# Patient Record
Sex: FEMALE | NOT HISPANIC OR LATINO
[De-identification: names, ages, dates, MRNs, and addresses within clinical notes are randomized per-mention and may not be internally consistent; named-entity substitution may affect disease eponyms.]

---

## 2017-07-25 ENCOUNTER — CHARTING TRANS (OUTPATIENT)
Dept: OTHER | Age: 22
End: 2017-07-25

## 2017-09-26 ENCOUNTER — MYAURORA ACCOUNT LINK (OUTPATIENT)
Dept: OTHER | Age: 22
End: 2017-09-26

## 2017-09-26 ENCOUNTER — CHARTING TRANS (OUTPATIENT)
Dept: OBGYN | Age: 22
End: 2017-09-26

## 2017-10-09 ENCOUNTER — CHARTING TRANS (OUTPATIENT)
Dept: OTHER | Age: 22
End: 2017-10-09

## 2017-10-18 ENCOUNTER — LAB SERVICES (OUTPATIENT)
Dept: OTHER | Age: 22
End: 2017-10-18

## 2017-10-18 ENCOUNTER — MYAURORA ACCOUNT LINK (OUTPATIENT)
Dept: OTHER | Age: 22
End: 2017-10-18

## 2017-10-18 ENCOUNTER — IMAGING SERVICES (OUTPATIENT)
Dept: OTHER | Age: 22
End: 2017-10-18

## 2017-10-18 ENCOUNTER — CHARTING TRANS (OUTPATIENT)
Dept: OBGYN | Age: 22
End: 2017-10-18

## 2017-10-18 LAB
BILIRUBIN URINE: NEGATIVE
BLOOD URINE: NEGATIVE
CLARITY: NORMAL
COLOR: YELLOW
GLUCOSE QUALITATIVE U: NEGATIVE
KETONES, URINE: NEGATIVE
LEUKOCYTE ESTERASE URINE: NEGATIVE
NITRITE URINE: NEGATIVE
PH URINE: 7 (ref 5–7)
SPECIFIC GRAVITY URINE: 1.01 (ref 1–1.03)
URINE PROTEIN, QUAL (DIPSTICK): NEGATIVE
UROBILINOGEN URINE: <2

## 2017-10-20 LAB — FINAL REPORT: NORMAL

## 2017-10-25 PROCEDURE — 87086 URINE CULTURE/COLONY COUNT: CPT | Performed by: OBSTETRICS & GYNECOLOGY

## 2017-10-25 PROCEDURE — 86900 BLOOD TYPING SEROLOGIC ABO: CPT | Performed by: OBSTETRICS & GYNECOLOGY

## 2017-10-25 PROCEDURE — 85025 COMPLETE CBC W/AUTO DIFF WBC: CPT | Performed by: OBSTETRICS & GYNECOLOGY

## 2017-10-25 PROCEDURE — 87340 HEPATITIS B SURFACE AG IA: CPT | Performed by: OBSTETRICS & GYNECOLOGY

## 2017-10-25 PROCEDURE — 86762 RUBELLA ANTIBODY: CPT | Performed by: OBSTETRICS & GYNECOLOGY

## 2017-10-25 PROCEDURE — 36415 COLL VENOUS BLD VENIPUNCTURE: CPT | Performed by: OBSTETRICS & GYNECOLOGY

## 2017-10-25 PROCEDURE — 87389 HIV-1 AG W/HIV-1&-2 AB AG IA: CPT | Performed by: OBSTETRICS & GYNECOLOGY

## 2017-10-25 PROCEDURE — 86850 RBC ANTIBODY SCREEN: CPT | Performed by: OBSTETRICS & GYNECOLOGY

## 2017-10-25 PROCEDURE — 86901 BLOOD TYPING SEROLOGIC RH(D): CPT | Performed by: OBSTETRICS & GYNECOLOGY

## 2017-10-25 PROCEDURE — 86780 TREPONEMA PALLIDUM: CPT | Performed by: OBSTETRICS & GYNECOLOGY

## 2017-10-26 ENCOUNTER — CHARTING TRANS (OUTPATIENT)
Dept: OTHER | Age: 22
End: 2017-10-26

## 2017-11-15 LAB — PAP TEST, THIN PREP (ACL): NORMAL

## 2017-11-21 PROBLEM — Z34.00 SUPERVISION OF NORMAL FIRST PREGNANCY: Status: ACTIVE | Noted: 2017-11-21

## 2017-11-21 PROBLEM — Z34.00 SUPERVISION OF NORMAL FIRST PREGNANCY (HCC): Status: ACTIVE | Noted: 2017-11-21

## 2017-12-27 ENCOUNTER — CHARTING TRANS (OUTPATIENT)
Dept: OTHER | Age: 22
End: 2017-12-27

## 2018-02-09 PROCEDURE — 87086 URINE CULTURE/COLONY COUNT: CPT | Performed by: OBSTETRICS & GYNECOLOGY

## 2018-03-21 PROCEDURE — 87389 HIV-1 AG W/HIV-1&-2 AB AG IA: CPT | Performed by: OBSTETRICS & GYNECOLOGY

## 2018-03-21 PROCEDURE — 86780 TREPONEMA PALLIDUM: CPT | Performed by: OBSTETRICS & GYNECOLOGY

## 2018-03-21 PROCEDURE — 82950 GLUCOSE TEST: CPT | Performed by: OBSTETRICS & GYNECOLOGY

## 2018-06-11 PROBLEM — Z34.00 SUPERVISION OF NORMAL FIRST PREGNANCY: Status: RESOLVED | Noted: 2017-11-21 | Resolved: 2018-06-06

## 2018-06-11 PROBLEM — Z34.00 SUPERVISION OF NORMAL FIRST PREGNANCY (HCC): Status: RESOLVED | Noted: 2017-11-21 | Resolved: 2018-06-06

## 2018-08-23 PROBLEM — Z30.41 SURVEILLANCE OF CONTRACEPTIVE PILL: Status: ACTIVE | Noted: 2018-08-23

## 2018-11-02 VITALS
SYSTOLIC BLOOD PRESSURE: 116 MMHG | TEMPERATURE: 97.7 F | DIASTOLIC BLOOD PRESSURE: 68 MMHG | RESPIRATION RATE: 20 BRPM | OXYGEN SATURATION: 99 % | HEIGHT: 65 IN | WEIGHT: 135 LBS | HEART RATE: 83 BPM | BODY MASS INDEX: 22.49 KG/M2

## 2018-11-23 ENCOUNTER — IMAGING SERVICES (OUTPATIENT)
Dept: OTHER | Age: 23
End: 2018-11-23

## 2018-11-28 VITALS
HEIGHT: 65 IN | DIASTOLIC BLOOD PRESSURE: 70 MMHG | BODY MASS INDEX: 22.66 KG/M2 | WEIGHT: 136 LBS | SYSTOLIC BLOOD PRESSURE: 110 MMHG

## 2018-11-28 VITALS
HEIGHT: 65 IN | BODY MASS INDEX: 22.33 KG/M2 | SYSTOLIC BLOOD PRESSURE: 108 MMHG | DIASTOLIC BLOOD PRESSURE: 68 MMHG | WEIGHT: 134 LBS

## 2020-11-15 ENCOUNTER — HOSPITAL ENCOUNTER (OUTPATIENT)
Age: 25
Discharge: HOME | End: 2020-11-15
Payer: COMMERCIAL

## 2020-11-15 VITALS
HEART RATE: 83 BPM | RESPIRATION RATE: 16 BRPM | OXYGEN SATURATION: 98 % | SYSTOLIC BLOOD PRESSURE: 119 MMHG | TEMPERATURE: 99 F | DIASTOLIC BLOOD PRESSURE: 74 MMHG

## 2020-11-15 DIAGNOSIS — J34.89 SINUS PAIN: Primary | ICD-10-CM

## 2020-11-15 PROCEDURE — 99202 OFFICE O/P NEW SF 15 MIN: CPT | Performed by: NURSE PRACTITIONER

## 2020-11-15 RX ORDER — AMOXICILLIN AND CLAVULANATE POTASSIUM 875; 125 MG/1; MG/1
1 TABLET, FILM COATED ORAL EVERY 12 HOURS
COMMUNITY
Start: 2020-11-14

## 2020-11-15 NOTE — ED PROVIDER NOTES
Patient Seen in: [facility]      History   Patient presents with:  Sinus Problem: Sinus pain, ear pain, headache - Entered by patient  Testing    Stated Complaint: in [phone] Sinus Problem - Sinus pain, ear pain, headache    25-year-o HPI.  Constitutional and vital signs reviewed. All other systems reviewed and negative except as noted above.     Physical Exam     ED Triage Vitals [11/15/20 1052]   /74   Pulse 83   Resp 16   Temp 98.7 °F (37.1 °C)   Temp src Temporal   SpO2 98 pain  (primary encounter diagnosis)    Disposition:  Discharge  11/15/2020 11:05 am    Follow-up:  No follow-up provider specified.         Medications Prescribed:  Discharge Medication List as of 11/15/2020 11:07 AM

## 2020-11-15 NOTE — ED INITIAL ASSESSMENT (HPI)
Patient states her sinuses are burning and is having ear pain. Pt had an e-visit yesterday and was prescribed Augmentin for possible ear infection. Patient started abx yesterday. Pt is breastfeeding. Pt exposed to covid by coworker.

## 2023-09-01 ENCOUNTER — HOSPITAL ENCOUNTER (OUTPATIENT)
Age: 28
Discharge: HOME | End: 2023-09-01
Payer: COMMERCIAL

## 2023-09-01 VITALS
WEIGHT: 130 LBS | DIASTOLIC BLOOD PRESSURE: 70 MMHG | HEART RATE: 84 BPM | SYSTOLIC BLOOD PRESSURE: 110 MMHG | HEIGHT: 65 IN | TEMPERATURE: 97 F | BODY MASS INDEX: 21.66 KG/M2 | RESPIRATION RATE: 16 BRPM | OXYGEN SATURATION: 100 %

## 2023-09-01 DIAGNOSIS — J06.9 VIRAL URI WITH COUGH: Primary | ICD-10-CM

## 2023-09-01 PROCEDURE — 99203 OFFICE O/P NEW LOW 30 MIN: CPT | Performed by: PHYSICIAN ASSISTANT

## 2023-09-01 RX ORDER — PREDNISONE 20 MG/1
40 TABLET ORAL DAILY
Qty: 10 TABLET | Refills: 0 | Status: SHIPPED | OUTPATIENT
Start: 2023-09-01 | End: 2023-09-06

## 2023-09-01 RX ORDER — ALBUTEROL SULFATE 90 UG/1
2 AEROSOL, METERED RESPIRATORY (INHALATION) EVERY 4 HOURS PRN
Qty: 1 EACH | Refills: 0 | Status: SHIPPED | OUTPATIENT
Start: 2023-09-01 | End: 2023-10-01

## 2023-09-29 ENCOUNTER — OFFICE VISIT (OUTPATIENT)
Dept: FAMILY MEDICINE CLINIC | Facility: CLINIC | Age: 28
End: 2023-09-29
Payer: COMMERCIAL

## 2023-09-29 VITALS
HEIGHT: 65 IN | TEMPERATURE: 98 F | DIASTOLIC BLOOD PRESSURE: 72 MMHG | OXYGEN SATURATION: 98 % | BODY MASS INDEX: 21.66 KG/M2 | WEIGHT: 130 LBS | RESPIRATION RATE: 18 BRPM | SYSTOLIC BLOOD PRESSURE: 115 MMHG

## 2023-09-29 DIAGNOSIS — J02.0 STREP PHARYNGITIS: Primary | ICD-10-CM

## 2023-09-29 DIAGNOSIS — J02.9 SORE THROAT: ICD-10-CM

## 2023-09-29 LAB
CONTROL LINE PRESENT WITH A CLEAR BACKGROUND (YES/NO): YES YES/NO
KIT LOT #: ABNORMAL NUMERIC
OPERATOR ID: NORMAL
RAPID SARS-COV-2 BY PCR: NOT DETECTED
STREP GRP A CUL-SCR: POSITIVE

## 2023-09-29 PROCEDURE — 99212 OFFICE O/P EST SF 10 MIN: CPT | Performed by: PHYSICIAN ASSISTANT

## 2023-09-29 PROCEDURE — 87880 STREP A ASSAY W/OPTIC: CPT | Performed by: PHYSICIAN ASSISTANT

## 2023-09-29 PROCEDURE — 3078F DIAST BP <80 MM HG: CPT | Performed by: PHYSICIAN ASSISTANT

## 2023-09-29 PROCEDURE — 3074F SYST BP LT 130 MM HG: CPT | Performed by: PHYSICIAN ASSISTANT

## 2023-09-29 PROCEDURE — 3008F BODY MASS INDEX DOCD: CPT | Performed by: PHYSICIAN ASSISTANT

## 2023-09-29 PROCEDURE — U0002 COVID-19 LAB TEST NON-CDC: HCPCS | Performed by: PHYSICIAN ASSISTANT

## 2023-09-29 RX ORDER — AMOXICILLIN 400 MG/5ML
POWDER, FOR SUSPENSION ORAL
Qty: 140 ML | Refills: 0 | Status: SHIPPED | OUTPATIENT
Start: 2023-09-29

## 2024-01-24 ENCOUNTER — HOSPITAL ENCOUNTER (OUTPATIENT)
Age: 29
Discharge: HOME | End: 2024-01-24
Payer: COMMERCIAL

## 2024-01-24 VITALS
OXYGEN SATURATION: 99 % | BODY MASS INDEX: 21.66 KG/M2 | HEART RATE: 75 BPM | TEMPERATURE: 99 F | WEIGHT: 130 LBS | HEIGHT: 65 IN | SYSTOLIC BLOOD PRESSURE: 112 MMHG | DIASTOLIC BLOOD PRESSURE: 70 MMHG | RESPIRATION RATE: 16 BRPM

## 2024-01-24 DIAGNOSIS — J32.9 SINOBRONCHITIS: Primary | ICD-10-CM

## 2024-01-24 DIAGNOSIS — J40 SINOBRONCHITIS: Primary | ICD-10-CM

## 2024-01-24 PROCEDURE — 99203 OFFICE O/P NEW LOW 30 MIN: CPT | Performed by: NURSE PRACTITIONER

## 2024-01-24 RX ORDER — PREDNISONE 20 MG/1
20 TABLET ORAL 2 TIMES DAILY
Qty: 10 TABLET | Refills: 0 | Status: SHIPPED | OUTPATIENT
Start: 2024-01-24 | End: 2024-01-29

## 2024-01-24 RX ORDER — MOMETASONE FUROATE 50 UG/1
1 SPRAY, METERED NASAL DAILY
Qty: 1 EACH | Refills: 0 | Status: SHIPPED | OUTPATIENT
Start: 2024-01-24

## 2024-01-24 RX ORDER — BENZONATATE 100 MG/1
100 CAPSULE ORAL 3 TIMES DAILY PRN
Qty: 30 CAPSULE | Refills: 0 | Status: SHIPPED | OUTPATIENT
Start: 2024-01-24 | End: 2024-02-23

## 2024-01-24 NOTE — DISCHARGE INSTRUCTIONS
Follow-up with your primary care physician for all of your healthcare needs  Continue using your inhaler at home  Steroids twice a day for 5 days  Tessalon Perles for the cough use the Nasonex as directed by the pharmacy  Return to the emergency room department symptoms or concerns

## 2024-01-24 NOTE — ED PROVIDER NOTES
Patient Seen in: [facility]      History     Chief Complaint   Patient presents with    Cough     Persistent cough, productive - Entered by patient     Stated Complaint: Cough - Persistent cough, productive    Subjective:   HPI    28-year-old female presents to the immediate care with cough congestion since Friday.  States the cough is getting a lot more productive.  But denies any shortness of breath denies any difficulty in breathing patient has mild sinus pressure, has been taking, Motrin mild symptoms.  Patient has no other issues with concerns.    The patient's medication list, past medical history and social history elements as listed in today's nurse's notes were reviewed and agreed (except as otherwise stated in the HPI).  The patient's family history reviewed and determined to be noncontributory to the presenting problem.        Objective:   History reviewed. No pertinent past medical history.           Past Surgical History:   Procedure Laterality Date    APPENDECTOMY  2011    LAPAROSCOPIC CHOLECYSTECTOMY  2013                Social History     Socioeconomic History    Marital status:    Tobacco Use    Smoking status: Never     Passive exposure: Never    Smokeless tobacco: Never   Vaping Use    Vaping Use: Never used   Substance and Sexual Activity    Alcohol use: No    Drug use: No    Sexual activity: Yes     Partners: Male              Review of Systems    Positive for stated complaint: Cough - Persistent cough, productive  Other systems are as noted in HPI.  Constitutional and vital signs reviewed.      All other systems reviewed and negative except as noted above.    Physical Exam     ED Triage Vitals [01/24/24 1259]   /70   Pulse 75   Resp 16   Temp 98.6 °F (37 °C)   Temp src Temporal   SpO2 99 %   O2 Device None (Room air)       Current:/70   Pulse 75   Temp 98.6 °F (37 °C) (Temporal)   Resp 16   Ht 165.1 cm (5' 5\")   Wt 59 kg   LMP 12/20/2023 (Exact Date)    SpO2 99%   BMI 21.63 kg/m²         Physical Exam    GENERAL: The patient is well-developed well-nourished nontoxic, non-ill-appearing  HEENT: Normocephalic.  Atraumatic.  Extraocular motions are intact  NECK: Supple.  No meningitic signs are noted.   CHEST/LUNGS: Clear to auscultation.  There is no respiratory distress noted.  HEART/CARDIOVASCULAR: Regular.  There is no tachycardia.   SKIN: There is no rash.  NEURO: The patient is awake, alert, and oriented.  The patient is cooperative.    ED Course   Labs Reviewed - No data to display                 MDM   Patient present with signs and symptoms consistent with upper respiratory infection and consider possible life-threatening etiologies are presenting complaint including severe bacterial infection, meningitis, acute cardiopulmonary process etc.  And doubt given; history, exam, .  Suspect likely viral etiology of upper respiratory infection.  Patient afebrile with normal vital signs and patient nontoxic appearing, well-hydrated in no apparent distress and no respiratory distress, this will discharge to follow-up with primary care physician in 2-3 days.  Supportive care instructions provided.  Patient to take over-the-counter and Tylenol and Motrin as needed for fever and pain.  Advised to return to the emergency room if any new or worsening symptoms including but not limited to; change in mental status.  Meningeal signs, abdominal pain, nausea vomiting, chest pain/shortness of breath, new rash, decreased urinary output or any other concerns.                                     Medical Decision Making      Disposition and Plan     Clinical Impression:  1. Sinobronchitis         Disposition:  Discharge  1/24/2024  1:23 pm    Follow-up:  No follow-up provider specified.        Medications Prescribed:  Discharge Medication List as of 1/24/2024  1:24 PM        START taking these medications    Details   benzonatate 100 MG Oral Cap Take 1 capsule (100 mg total) by mouth  3 (three) times daily as needed for cough., Normal, Disp-30 capsule, R-0      predniSONE 20 MG Oral Tab Take 1 tablet (20 mg total) by mouth 2 (two) times daily for 5 days., Normal, Disp-10 tablet, R-0      mometasone furoate 50 MCG/ACT Nasal Suspension 1 spray by Nasal route daily., Normal, Disp-1 each, R-0

## 2024-09-12 ENCOUNTER — HOSPITAL ENCOUNTER (OUTPATIENT)
Age: 29
Discharge: HOME | End: 2024-09-12
Payer: COMMERCIAL

## 2024-09-12 VITALS
RESPIRATION RATE: 18 BRPM | HEART RATE: 66 BPM | OXYGEN SATURATION: 98 % | SYSTOLIC BLOOD PRESSURE: 108 MMHG | TEMPERATURE: 98 F | DIASTOLIC BLOOD PRESSURE: 88 MMHG

## 2024-09-12 DIAGNOSIS — J02.0 STREPTOCOCCAL SORE THROAT: Primary | ICD-10-CM

## 2024-09-12 LAB
S PYO AG THROAT QL: POSITIVE
SARS-COV-2 RNA RESP QL NAA+PROBE: NOT DETECTED

## 2024-09-12 PROCEDURE — 99213 OFFICE O/P EST LOW 20 MIN: CPT | Performed by: NURSE PRACTITIONER

## 2024-09-12 PROCEDURE — 87880 STREP A ASSAY W/OPTIC: CPT | Performed by: NURSE PRACTITIONER

## 2024-09-12 PROCEDURE — U0002 COVID-19 LAB TEST NON-CDC: HCPCS | Performed by: NURSE PRACTITIONER

## 2024-09-12 RX ORDER — AMOXICILLIN 875 MG
875 TABLET ORAL 2 TIMES DAILY
Qty: 20 TABLET | Refills: 0 | Status: SHIPPED | OUTPATIENT
Start: 2024-09-12 | End: 2024-09-22

## 2024-09-12 NOTE — ED INITIAL ASSESSMENT (HPI)
9/11 Pt started with sore throat  9/12 Sinus pain/pressure, ear pain, sore throat    Denies: fevers

## 2024-09-12 NOTE — ED PROVIDER NOTES
Patient Seen in: [facility]      History     Chief Complaint   Patient presents with    Sore Throat    Sinus Problem     Stated Complaint: sore throat    Subjective:   29-year-old female presenting complaints of sore throat evaluate symptoms and a ear pain.  Denies any difficulty swelling her joints.  No stridor or shortness of breath.  Patient is alert vented x 3.  No other symptoms or concerns.              Objective:   History reviewed. No pertinent past medical history.           Past Surgical History:   Procedure Laterality Date    Appendectomy  2011    Laparoscopic cholecystectomy  2013                Social History     Socioeconomic History    Marital status:    Tobacco Use    Smoking status: Never     Passive exposure: Never    Smokeless tobacco: Never   Vaping Use    Vaping status: Never Used   Substance and Sexual Activity    Alcohol use: No    Drug use: No    Sexual activity: Yes     Partners: Male     Social Determinants of Health     Food Insecurity: Low Risk  (4/9/2022)    Received from [facility]    Food Insecurity     Have there been times that your food ran out, and you didn't have money to get more?: No     Are there times that you worry that this might happen?: No   Transportation Needs: Low Risk  (4/9/2022)    Received from [facility]    Transportation Needs     Do you have trouble getting transportation to medical appointments?: No   Housing Stability: Low Risk  (4/9/2022)    Received from [facility]    Housing Stability     Are you concerned about having a safe and reliable place to live?: No              Review of Systems    Positive for stated Chief Complaint: Sore Throat and Sinus Problem    Other systems are as noted in HPI.  Constitutional and vital signs reviewed.      All other systems reviewed and  negative except as noted above.    Physical Exam     ED Triage Vitals [09/12/24 1348]   /88   Pulse 66   Resp 18   Temp 97.9 °F (36.6 °C)   Temp src Temporal   SpO2 98 %   O2 Device None (Room air)       Current Vitals:   Vital Signs  BP: 108/88  Pulse: 66  Resp: 18  Temp: 97.9 °F (36.6 °C)  Temp src: Temporal    Oxygen Therapy  SpO2: 98 %  O2 Device: None (Room air)            Physical Exam  Vitals and nursing note reviewed.   Constitutional:       Appearance: Normal appearance.   HENT:      Head: Normocephalic.      Right Ear: Tympanic membrane normal.      Left Ear: Tympanic membrane normal.      Nose: Nose normal.      Mouth/Throat:      Mouth: Mucous membranes are moist.      Pharynx: Posterior oropharyngeal erythema present.   Eyes:      Pupils: Pupils are equal, round, and reactive to light.   Cardiovascular:      Rate and Rhythm: Normal rate.   Pulmonary:      Effort: Pulmonary effort is normal.      Breath sounds: Normal breath sounds.   Musculoskeletal:      Cervical back: Normal range of motion and neck supple.   Lymphadenopathy:      Cervical: Cervical adenopathy present.   Skin:     General: Skin is warm and dry.   Neurological:      Mental Status: She is alert and oriented to person, place, and time.               ED Course     Labs Reviewed   POCT RAPID STREP - Abnormal; Notable for the following components:       Result Value    POCT Rapid Strep Positive (*)     All other components within normal limits   RAPID SARS-COV-2 BY PCR - Normal                      MDM                                      Medical Decision Making  Differential diagnosis includes but is not limited to: Strep throat, COVID-19, viral illness, pneumonia      Patient is a complaints of sore throat with ear pain and URI symptoms.  Testing was done and negative.  Rapid strep was positive.  Will treat with amoxicillin take as directed.  To continue taking Tylenol Motrin.  Push fluids and rest.  Encouraged follow-up with  primary care physician in 1 week if symptoms do not improve.  Patient verbalized understanding and agreed to plan of care.    Amount and/or Complexity of Data Reviewed  Labs: ordered. Decision-making details documented in ED Course.     Details: Rapid strep  COVID-19    Risk  OTC drugs.  Prescription drug management.        Disposition and Plan     Clinical Impression:  1. Streptococcal sore throat         Disposition:  Discharge  9/12/2024  2:14 pm    Follow-up:  PCP    In 1 week  As needed          Medications Prescribed:  Current Discharge Medication List        START taking these medications    Details   amoxicillin 875 MG Oral Tab Take 1 tablet (875 mg total) by mouth 2 (two) times daily for 10 days.  Qty: 20 tablet, Refills: 0

## 2024-12-08 ENCOUNTER — OFFICE VISIT (OUTPATIENT)
Dept: FAMILY MEDICINE CLINIC | Facility: CLINIC | Age: 29
End: 2024-12-08
Payer: COMMERCIAL

## 2024-12-08 VITALS
HEART RATE: 75 BPM | WEIGHT: 131 LBS | BODY MASS INDEX: 21.83 KG/M2 | DIASTOLIC BLOOD PRESSURE: 68 MMHG | SYSTOLIC BLOOD PRESSURE: 102 MMHG | OXYGEN SATURATION: 99 % | HEIGHT: 65 IN | TEMPERATURE: 98 F | RESPIRATION RATE: 16 BRPM

## 2024-12-08 DIAGNOSIS — J02.0 STREP PHARYNGITIS: Primary | ICD-10-CM

## 2024-12-08 LAB
CONTROL LINE PRESENT WITH A CLEAR BACKGROUND (YES/NO): YES YES/NO
KIT LOT #: ABNORMAL NUMERIC
STREP GRP A CUL-SCR: POSITIVE

## 2024-12-08 RX ORDER — AMOXICILLIN 500 MG/1
500 CAPSULE ORAL 2 TIMES DAILY
Qty: 20 CAPSULE | Refills: 0 | Status: SHIPPED | OUTPATIENT
Start: 2024-12-08 | End: 2024-12-18

## 2024-12-08 NOTE — PATIENT INSTRUCTIONS
1. Rest. Drink plenty of fluids.  2. Tylenol/Ibuprofen for pain/fevers. Amoxicillin as prescribed.  3. Salt water gargles three times daily  4. Use humidifier at home when possible.  5. The rapid strep test is positive.  6. Viral testing declined.    7. Follow up with PMD in 4-5 days for re-eval. Go to the emergency department immediately if symptoms worsen, change, you develop chest discomfort, wheezing, shortness of breath, or if you have any concerns.

## 2024-12-08 NOTE — PROGRESS NOTES
CHIEF COMPLAINT:     Chief Complaint   Patient presents with    Sore Throat     Yesterday, sore throat,   OTC advil  Exposure to strep       HPI:   Mariela Fontanez is a 29 year old female who presents for sore throat. Patient reports symtpoms started yesterday.  Denies any fevers, cough,wheezing, chest discomfort, or shortness of breath. .  Treating symptoms with otc meds.   Tolerates PO well at home. No n/v/d.  Denies any other aggravating or relieving factors at home. Denies any other treatment attempts prior to arrival.   + strep exposure. Denies known covid-19 expsoure.    Current Outpatient Medications   Medication Sig Dispense Refill    amoxicillin 500 MG Oral Cap Take 1 capsule (500 mg total) by mouth 2 (two) times daily for 10 days. 20 capsule 0      No past medical history on file.   Past Surgical History:   Procedure Laterality Date    Appendectomy  2011    Laparoscopic cholecystectomy  2013         Social History     Socioeconomic History    Marital status:    Tobacco Use    Smoking status: Never     Passive exposure: Never    Smokeless tobacco: Never   Vaping Use    Vaping status: Never Used   Substance and Sexual Activity    Alcohol use: No    Drug use: No    Sexual activity: Yes     Partners: Male     Social Drivers of Health     Food Insecurity: Low Risk  (4/9/2022)    Received from [facility]    Food Insecurity     Have there been times that your food ran out, and you didn't have money to get more?: No     Are there times that you worry that this might happen?: No   Transportation Needs: Low Risk  (4/9/2022)    Received from [facility]    Transportation Needs     Do you have trouble getting transportation to medical appointments?: No   Housing Stability: Low Risk  (4/9/2022)    Received from [facility]    Housing Stability      Are you concerned about having a safe and reliable place to live?: No         REVIEW OF SYSTEMS:   GENERAL: Denies fever. Notes good appetite  SKIN: no rashes or abnormal skin lesions  HEENT: + sore throat, Denies sinus symptoms, or ear pain  LUNGS: Denies cough, denies shortness of breath or wheezing,   CARDIOVASCULAR: denies chest pain or palpitations   GI: denies N/V/C or abdominal pain  NEURO: Denies headaches    EXAM:   /68   Pulse 75   Temp 98 °F (36.7 °C)   Resp 16   Ht 5' 5\" (1.651 m)   Wt 131 lb (59.4 kg)   LMP 11/17/2024 (Approximate)   SpO2 99%   Breastfeeding No   BMI 21.80 kg/m²   GENERAL: well developed, well nourished,in no apparent distress  SKIN: no rashes,no suspicious lesions  HEAD: atraumatic, normocephalic.    EYES: conjunctiva clear  EARS: TM's intact and without erythema, no bulging, no retraction,no fluid, bony landmarks visualized. No erythema or swelling noted to ear canals or external ears.   NOSE: Nostrils patent, no nasal discharge, nasal mucosa reddened   THROAT: Oral mucosa pink, moist. Posterior pharynx is erythematous. No exudates. No tonsillar hypertrophy noted.  No trismus. Uvula midline with no swelling. Voice clear/normal. No stridor  NECK: Supple, non-tender  LUNGS: clear to auscultation bilaterally, no rales, wheezes or rhonchi. Breathing is non labored.  CARDIO: RRR without murmur  EXTREMITIES: no cyanosis, clubbing or edema  LYMPH:  + ant cerv lymphadenopathy.        ASSESSMENT AND PLAN:       ICD-10-CM    1. Strep pharyngitis  J02.0 Strep A Assay W/Optic     amoxicillin 500 MG Oral Cap        Rapid strep positive  Viral panel testing declined.     Discussed physical exam and hpi with pt.  Pt has reassuring physical exam consistent with pharyngitis. Rapid strep positive. No signs of pta or retropharyngeal infection.Lungs clear bilat. No respiratory distress noted. We discussed possible concurrent flu or covid infection. Pt declined. Treatment options  discussed with patient and explained in detail. We reviewed symptomatic care at home and will start amoxicillin for strep pharyngitis.. The risks, benefits and potential side effects of possible medications were reviewed. Alternatives were discussed. Monitoring parameters and expected course outlined. We reviewed self quarantine guidelines. Patient to call PCP or go to emergency department if symptoms fail to respond as outlined, or worsen in any way. The patient agreed with the plan.       Patient Instructions   1. Rest. Drink plenty of fluids.  2. Tylenol/Ibuprofen for pain/fevers. Amoxicillin as prescribed.  3. Salt water gargles three times daily  4. Use humidifier at home when possible.  5. The rapid strep test is positive.  6. Viral testing declined.    7. Follow up with PMD in 4-5 days for re-eval. Go to the emergency department immediately if symptoms worsen, change, you develop chest discomfort, wheezing, shortness of breath, or if you have any concerns.

## 2025-01-07 NOTE — PROGRESS NOTES
ID: Mariela Fontanez  : 1995  Date: 2025     Chief Complaint   Patient presents with    Pelvic Pain     Self referred       HPI:  29 year old female, G3, Vaginal deliveries x3, who is self referred. She presents with symptoms of \"extreme pain with menstruation\". Periods have been heavier and extremely painful, associated with nausea and malaise.  Follows up with Dr. Garrido with Gyn.  Reports she has been tried on different birth control with no relief, so she stopped.  Has also had normal ultrasounds and a normal EMB as well.   Has chronic constipation with bowel movements every 3 days.  Takes stool softeners as needed. She has been experiencing worsening pain with defecation and describes vaginal drainage and bleeding when she is having a BM.   Denies urinary frequency, urgency, or nocturia. No UUI.  Some BESSIE  Denies urinary tract infections. No urines on file.   Also bothered by dyspareunia, which is new.   Healthy otherwise. Has had prior appendectomy and cholecystectomy.        Urogynecology Summary:  Urogynecology Summary  Prolapse: No  BESSIE: Yes  Urge Incontinence: No  Nocturia Frequency: 0  Frequency: Greater than 3 hours  Incomplete emptying: No  Constipation: Yes  Wears pad day?: 1 (light)  Wears Pad Night?: 0  Activities are limited by UI/POP?: No (pain)  Currently Sexually Active: Yes  Avoids sexual activity due to: Pain          HISTORY:  Past Medical History:     (normal spontaneous vaginal delivery) ([facility])    x3      Past Surgical History:   Procedure Laterality Date    Appendectomy  2011    Laparoscopic cholecystectomy        Family History   Problem Relation Age of Onset    Hypertension Mother     Kidney Disease Mother     Diabetes Father     Hypertension Father     Dementia Maternal Grandmother     Diabetes Paternal Grandmother       Social History     Socioeconomic History    Marital status:    Tobacco Use    Smoking status: Never     Passive exposure: Never    Smokeless  tobacco: Never   Vaping Use    Vaping status: Never Used   Substance and Sexual Activity    Alcohol use: No    Drug use: No    Sexual activity: Yes     Partners: Male     Social Drivers of Health     Food Insecurity: Low Risk  (4/9/2022)    Received from [facility]    Food Insecurity     Have there been times that your food ran out, and you didn't have money to get more?: No     Are there times that you worry that this might happen?: No   Transportation Needs: Low Risk  (4/9/2022)    Received from [facility]    Transportation Needs     Do you have trouble getting transportation to medical appointments?: No   Housing Stability: Low Risk  (4/9/2022)    Received from [facility]    Housing Stability     Are you concerned about having a safe and reliable place to live?: No        Allergies:  Allergies[1]    Medications:  Medications Prior to Visit[2]    Review of Systems:    A comprehensive 12 point review of systems was completed.  Pertinent positives noted in the the HPI.  Denies CP  Denies SOB    Vitals:  /70   Temp 98.1 °F (36.7 °C)   Ht 67\"   Wt 130 lb (59 kg)   LMP 12/12/2024 (Approximate)   BMI 20.36 kg/m²        GENERAL EXAM:  GENERAL:  Alert and oriented. Well-nourished, normally developed.  Thought and emotional status are appropriate, speech is understandable.  No acute distress.   HEAD: Normocephalic and atraumatic with normal hair distribution  LUNGS:  Normal respiratory effort.    ABDOMEN: Non tender to palpation, tone normal without rigidity or guarding, no masses present, no evidence of hernia.   EXTREMITIES:  Without edema, varicosities or lesions.   SKIN:  Warm and dry, with good color and turgor. No lesions.    PELVIC EXAM:  External Genitalia: Normal appearance for age. No atrophy, no lesions  Urethra: no atrophy, non  tender  Bladder:no fullness, non tender  Vagina: no atrophy, no lesions, + white thick discharge. Vaginitis panel obtained   Cervix: no bleeding, no lesions, non tender  Uterus: soft, mobile, non tender  Adnexa:no masses, non tender  Perineum: non tender  Anus: no hemorrhoids  Rectum: + tender with palpation, no masses.     PELVIS FLOOR NEUROMUSCULAR FUNCTION:  Strength:  3  Perineal Sensation:  Normal      PELVIC SUPPORT:  [place]:  0  Ant:  0  Post:  0  CST:  negative  UVJ: not hypermobile    The patient provided a clean catch urine specimen. Dip + trace blood. Specimen sent for micro, C&S.     Impression/Plan:    ICD-10-CM    1. Pelvic pain  R10.2 MRI PELVIS (SOFT TISSUE) (W+WO) (CPT=72197)     PHYSICAL THERAPY - [facility]      2. Menorrhagia with irregular cycle  N92.1       3. Dysmenorrhea  N94.6       4. Constipation, unspecified constipation type  K59.00       5. Rectal pain  K62.89       6. Vaginal discharge  N89.8 Vaginitis Vaginosis PCR Panel      7. Dyspareunia in female  N94.10           Discussion:   Pelvic muscle rehabilitation including pelvic floor PT  Bowel routine/constipation regimen  Discussed multifactorial nature of symptoms (endometriosis, adenomyosis, constipation, muscle pain) and need for multidisciplinary approach to treatment.     Diagnostic Items:  UA, micro, C&S  Pelvic MRI     Medications Discussed:  Daily fiber and Miralax    Treatment Plan, Non-surgical:   Pelvic floor PT. Referral provided.     Treatment Plan, Surgical:   None at this time.     Recommend referral to see Dr. Mauricio ([facility]) and Dr. Lobo ([facility]). Pt and  verbalize understanding of all above discussed information    Follow up in 4 months or sooner prn.     Cydney Jackson MD, FACOG, FACS  Female Pelvic Medicine and  Reconstructive Surgery (Urogynecology)             [1]   Allergies  Allergen Reactions    Sulfa Antibiotics HIVES   [2]   No outpatient medications prior to visit.     No  facility-administered medications prior to visit.

## 2025-01-08 ENCOUNTER — OFFICE VISIT (OUTPATIENT)
Dept: UROLOGY | Facility: CLINIC | Age: 30
End: 2025-01-08
Attending: OBSTETRICS & GYNECOLOGY
Payer: COMMERCIAL

## 2025-01-08 VITALS
HEIGHT: 67 IN | DIASTOLIC BLOOD PRESSURE: 70 MMHG | SYSTOLIC BLOOD PRESSURE: 110 MMHG | WEIGHT: 130 LBS | TEMPERATURE: 98 F | BODY MASS INDEX: 20.4 KG/M2

## 2025-01-08 DIAGNOSIS — K62.89 RECTAL PAIN: ICD-10-CM

## 2025-01-08 DIAGNOSIS — N94.6 DYSMENORRHEA: ICD-10-CM

## 2025-01-08 DIAGNOSIS — K59.00 CONSTIPATION, UNSPECIFIED CONSTIPATION TYPE: ICD-10-CM

## 2025-01-08 DIAGNOSIS — N89.8 VAGINAL DISCHARGE: ICD-10-CM

## 2025-01-08 DIAGNOSIS — N94.10 DYSPAREUNIA IN FEMALE: ICD-10-CM

## 2025-01-08 DIAGNOSIS — N92.1 MENORRHAGIA WITH IRREGULAR CYCLE: ICD-10-CM

## 2025-01-08 DIAGNOSIS — R10.2 PELVIC PAIN: Primary | ICD-10-CM

## 2025-01-08 LAB
BILIRUB UR QL STRIP.AUTO: NEGATIVE
CLARITY UR REFRACT.AUTO: CLEAR
CONTROL RUN WITHIN 24 HOURS?: YES
GLUCOSE UR STRIP.AUTO-MCNC: NORMAL MG/DL
KETONES UR STRIP.AUTO-MCNC: NEGATIVE MG/DL
LEUKOCYTE ESTERASE UR QL STRIP.AUTO: NEGATIVE
LEUKOCYTE ESTERASE URINE: NEGATIVE
NITRITE UR QL STRIP.AUTO: NEGATIVE
NITRITE URINE: NEGATIVE
PH UR STRIP.AUTO: 6.5 [PH] (ref 5–8)
PROT UR STRIP.AUTO-MCNC: NEGATIVE MG/DL
RBC UR QL AUTO: NEGATIVE
SP GR UR STRIP.AUTO: 1.02 (ref 1–1.03)
UROBILINOGEN UR STRIP.AUTO-MCNC: NORMAL MG/DL

## 2025-01-08 PROCEDURE — 81002 URINALYSIS NONAUTO W/O SCOPE: CPT | Performed by: OBSTETRICS & GYNECOLOGY

## 2025-01-08 PROCEDURE — 87086 URINE CULTURE/COLONY COUNT: CPT | Performed by: OBSTETRICS & GYNECOLOGY

## 2025-01-08 PROCEDURE — 99212 OFFICE O/P EST SF 10 MIN: CPT

## 2025-01-08 PROCEDURE — 81514 NFCT DS BV&VAGINITIS DNA ALG: CPT | Performed by: OBSTETRICS & GYNECOLOGY

## 2025-01-08 PROCEDURE — 81003 URINALYSIS AUTO W/O SCOPE: CPT | Performed by: OBSTETRICS & GYNECOLOGY

## 2025-01-10 ENCOUNTER — TELEPHONE (OUTPATIENT)
Dept: UROLOGY | Facility: CLINIC | Age: 30
End: 2025-01-10

## 2025-01-10 LAB
BV BACTERIA DNA VAG QL NAA+PROBE: POSITIVE
C GLABRATA DNA VAG QL NAA+PROBE: NEGATIVE
C KRUSEI DNA VAG QL NAA+PROBE: NEGATIVE
CANDIDA DNA VAG QL NAA+PROBE: NEGATIVE
T VAGINALIS DNA VAG QL NAA+PROBE: NEGATIVE

## 2025-01-10 RX ORDER — METRONIDAZOLE 7.5 MG/G
5 GEL VAGINAL NIGHTLY
Qty: 70 G | Refills: 0 | Status: SHIPPED | OUTPATIENT
Start: 2025-01-10

## 2025-01-10 NOTE — TELEPHONE ENCOUNTER
TC to pt w/ ucx and vag swab results.   Per Dr Jackson: Metrogel 1 applicatorfull per vagina at bedtime x 5 days   Pt verbalized an understanding and agrees w/ plan. All questions answered.

## 2025-01-30 ENCOUNTER — HOSPITAL ENCOUNTER (OUTPATIENT)
Dept: MRI IMAGING | Age: 30
Discharge: HOME | End: 2025-01-30
Attending: OBSTETRICS & GYNECOLOGY
Payer: COMMERCIAL

## 2025-01-30 DIAGNOSIS — R10.2 PELVIC PAIN: ICD-10-CM

## 2025-01-30 PROCEDURE — 72197 MRI PELVIS W/O & W/DYE: CPT | Performed by: OBSTETRICS & GYNECOLOGY

## 2025-01-30 PROCEDURE — A9575 INJ GADOTERATE MEGLUMI 0.1ML: HCPCS | Performed by: OBSTETRICS & GYNECOLOGY

## 2025-01-30 RX ORDER — GADOTERATE MEGLUMINE 376.9 MG/ML
12 INJECTION INTRAVENOUS
Status: COMPLETED | OUTPATIENT
Start: 2025-01-30 | End: 2025-01-30

## 2025-01-30 RX ADMIN — GADOTERATE MEGLUMINE 12 ML: 376.9 INJECTION INTRAVENOUS at 13:53:00

## 2025-03-13 ENCOUNTER — OFFICE VISIT (OUTPATIENT)
Facility: LOCATION | Age: 30
End: 2025-03-13
Payer: COMMERCIAL

## 2025-03-13 VITALS
DIASTOLIC BLOOD PRESSURE: 73 MMHG | HEART RATE: 68 BPM | RESPIRATION RATE: 18 BRPM | SYSTOLIC BLOOD PRESSURE: 104 MMHG | OXYGEN SATURATION: 100 % | TEMPERATURE: 98 F

## 2025-03-13 DIAGNOSIS — K62.89 RECTAL PAIN: Primary | ICD-10-CM

## 2025-03-13 DIAGNOSIS — K62.5 RECTAL BLEEDING: ICD-10-CM

## 2025-03-13 PROCEDURE — 99203 OFFICE O/P NEW LOW 30 MIN: CPT | Performed by: STUDENT IN AN ORGANIZED HEALTH CARE EDUCATION/TRAINING PROGRAM

## 2025-03-13 PROCEDURE — 3074F SYST BP LT 130 MM HG: CPT | Performed by: STUDENT IN AN ORGANIZED HEALTH CARE EDUCATION/TRAINING PROGRAM

## 2025-03-13 PROCEDURE — 3078F DIAST BP <80 MM HG: CPT | Performed by: STUDENT IN AN ORGANIZED HEALTH CARE EDUCATION/TRAINING PROGRAM

## 2025-03-13 RX ORDER — POLYETHYLENE GLYCOL 3350, SODIUM CHLORIDE, SODIUM BICARBONATE, POTASSIUM CHLORIDE 420; 11.2; 5.72; 1.48 G/4L; G/4L; G/4L; G/4L
POWDER, FOR SOLUTION ORAL
Qty: 1 EACH | Refills: 0 | Status: SHIPPED | OUTPATIENT
Start: 2025-03-13

## 2025-03-17 ENCOUNTER — TELEPHONE (OUTPATIENT)
Facility: LOCATION | Age: 30
End: 2025-03-17

## 2025-03-17 NOTE — TELEPHONE ENCOUNTER
JOSE ALEJANDRO GALLOWAY Patient  Member ID  [id number]    Date of Birth  1995-07-09    Gender  Female    Relationship to Subscriber  Spouse    Subscriber Name  RAQUEL GALLOWAY    Transaction Type  Outpatient Authorization    Organization  [facility]    Payer  [facility] logo     Certificate Information  Reference Number  [id number]    Status  NO ACTION REQUIRED    Message  Requested Service does not require preauthorization. We would strongly encourage you to check benefits for this service.    Member Information  Patient Name  JOSE ALEJANDRO GALLOWAY    Patient Date of Birth  1995-07-09    Patient Gender  Female    Member ID  [id number]    Relationship to Subscriber  Spouse    Subscriber Name  RAQUEL GALLOWAY    Requesting Provider     Name  MJ RHODA    NPI  [id number]    Tax Id  [id number]    Specialty  [id number]    Provider Role  Provider    Address  [address] [place] [address]    Phone  [phone]    Fax  [phone]    Contact Name  AXEL COTTO    Service Information  Service Type  2 - Surgical    Place of Service  22 - On [facility]    Service From - To Date  2025-04-01 - 2025-06-27    Level of Service  Elective    Diagnosis Code 1   - Other specified diseases of anus and rectum    Diagnosis Code 2  K625 - Hemorrhage of anus and rectum    Procedure Code 1 (CPT/HCPCS)  21618 - DIAGNOSTIC COLONOSCOPY    Quantity  1 Units    Status  NO ACTION REQUIRED

## 2025-03-18 NOTE — H&P
New Patient Visit Note       Active Problems      1. Rectal pain    2. Rectal bleeding        Chief Complaint   Chief Complaint   Patient presents with    New Patient     NP - Referral from UroGyn Dr. Jackson for rectal pain with menstruation. MRI PELVIS (SOFT TISSUE) . Pt reports rectal pain during menstruation for approximately the last year. Pt reports rectal bleeding. Pt denies constipation or diarrhea.        History of Present Illness   This is a very nice 29-year-old female who was referred to me from Dr. Mauricio of gynecology with concern for endometriosis with possible colorectal involvement.  Patient has noticed rectal pain and bleeding with bowel movements for the last year.  The symptoms only happen when the patient is menstruating.  Patient generally becomes constipated the week before her menstrual cycle.  When patient is not menstruating, she denies any rectal pain, bleeding or constipation.    Patient also has generalized severe pelvic pain during her menstrual cycles.  She is thought to have endometriosis and possible adenomyosis.  The symptoms have been refractory to medical therapy thus far.  Patient has noticed that when she has a bowel movement during her menstrual cycle, she will have occasions with severe pain causing vaginal discharge and bleeding after the bowel movement.  Patient has a history of 3 vaginal deliveries and denies any history of obstetric cares.  She denies any fecal incontinence.  Patient has had a colonoscopy around 10 years ago which was normal.  No family history of colon or rectal cancer.  No blood thinners.  No prior abdominal or anorectal surgery.    Allergies  Mariela is allergic to sulfa antibiotics.    Past Medical / Surgical / Social / Family History    The past medical and past surgical history have been reviewed by me today.    Past Medical History:    Allergic rhinitis     (normal spontaneous vaginal delivery) (HCC)    x3     Past Surgical History:    Procedure Laterality Date    Appendectomy  2011    Appendectomy  2011    Cholecystectomy      Colonoscopy      Laparoscopic cholecystectomy  2013          X3    Reconstr nose+markus septal repair         The family history and social history have been reviewed by me today.    Family History   Problem Relation Age of Onset    Hypertension Mother     Kidney Disease Mother     Diabetes Father     Hypertension Father     Dementia Maternal Grandmother     Diabetes Paternal Grandmother      Social History     Socioeconomic History    Marital status:    Tobacco Use    Smoking status: Never     Passive exposure: Never    Smokeless tobacco: Never   Vaping Use    Vaping status: Never Used   Substance and Sexual Activity    Alcohol use: Yes     Comment: Rare occasion, < 1/month    Drug use: Never    Sexual activity: Yes     Partners: Male   Other Topics Concern    Caffeine Concern No    Exercise Yes    Seat Belt Yes    Special Diet No    Stress Concern No    Weight Concern No        Current Outpatient Medications:     PEG 3350-KCl-Na Bicarb-NaCl (TRILYTE) 420 g Oral Recon Soln, Starting at 4:00 pm the night before procedure, drink 8 ounces of the prep every 15-20 minutes until finished, Disp: 1 each, Rfl: 0    metroNIDAZOLE 0.75 % Vaginal Gel, Place 5 g vaginally nightly., Disp: 70 g, Rfl: 0      Review of Systems  A 10 point review of systems was performed and negative unless otherwise documented per HPI.    Physical Findings   /73 (BP Location: Right arm, Patient Position: Sitting, Cuff Size: adult)   Pulse 68   Temp 98.3 °F (36.8 °C) (Temporal)   Resp 18   LMP 2024 (Approximate)   SpO2 100%   Physical Exam  Vitals and nursing note reviewed. Exam conducted with a chaperone present.   Constitutional:       General: She is not in acute distress.  HENT:      Head: Normocephalic and atraumatic.      Mouth/Throat:      Mouth: Mucous membranes are moist.   Cardiovascular:      Rate and Rhythm:  Normal rate and regular rhythm.   Pulmonary:      Effort: Pulmonary effort is normal.   Abdominal:      General: There is no distension.      Palpations: Abdomen is soft.      Tenderness: There is no abdominal tenderness.   Musculoskeletal:         General: No deformity.   Skin:     General: Skin is warm and dry.   Neurological:      General: No focal deficit present.      Mental Status: She is alert.   Psychiatric:         Mood and Affect: Mood normal.     I have personally reviewed the imaging of patient's recent MRI pelvis from 1/30/2025.  I agree with radiology conclusion as below    CONCLUSION:    1. Small amount of free fluid in the pelvis is likely physiologic.   2. Tortuosity the bilateral gonadal veins, worse on the left is nonspecific although may be seen with pelvic congestion.  Correlate clinically.        Assessment   1. Rectal pain    2. Rectal bleeding        This is a very nice 29-year-old female who was referred to me from Dr. Mauricio of gynecology with concern for endometriosis with possible colorectal involvement.  Patient has noticed rectal pain and bleeding with bowel movements for the last year.  The symptoms only happen when the patient is menstruating.  Patient generally becomes constipated the week before her menstrual cycle.  When patient is not menstruating, she denies any rectal pain, bleeding or constipation.    Patient also has generalized severe pelvic pain during her menstrual cycles.  She is thought to have endometriosis and possible adenomyosis.  The symptoms have been refractory to medical therapy thus far.  Patient has noticed that when she has a bowel movement during her menstrual cycle, she will have occasions with severe pain causing vaginal discharge and bleeding after the bowel movement.  Patient has a history of 3 vaginal deliveries and denies any history of obstetric cares.  She denies any fecal incontinence.  Patient has had a colonoscopy around 10 years ago which was  normal.  No family history of colon or rectal cancer.  No blood thinners.  No prior abdominal or anorectal surgery.    Patient is well-appearing on exam today.  Patient has had a recent MRI of the pelvis on 1/30/2025 which was normal aside from a small amount of free fluid in the pelvis thought to be physiologic and tortuosity of the bilateral gonadal veins, worse on the left.  This finding is nonspecific though may be seen with pelvic congestion per radiology.    Plan  I counseled patient that her symptoms are suspicious for possible colon or rectal involvement of endometriosis.  I would like to review the patient's recent MRI in detail to see if there is suggestion of bowel, colon or rectal involvement of endometriosis on this imaging.  I also recommend planning for a colonoscopy with digital rectal exam and bimanual pelvic examination under anesthesia to rule out any full-thickness colorectal endometriosis.  The details of the colonoscopy procedure were discussed including the prep instructions, risks, benefits and alternatives.  Patient expressed understanding and was agreeable to schedule a colonoscopy.  This has been scheduled for 4/1/2025 at [facility] under monitored anesthesia care.    After completion of the colonoscopy, careful review the MRI and discussion of the patient's case with Dr. Mauricio, I will coordinate care with Dr. Mauricio and determine my involvement for any upcoming surgical intervention in regards to the patient's endometriosis.     No orders of the defined types were placed in this encounter.      Imaging & Referrals   None    Follow Up  No follow-ups on file.    Jayro Lobo MD

## 2025-03-18 NOTE — H&P (VIEW-ONLY)
New Patient Visit Note       Active Problems      1. Rectal pain    2. Rectal bleeding        Chief Complaint   Chief Complaint   Patient presents with    New Patient     NP - Referral from UroGyn Dr. Jackson for rectal pain with menstruation. MRI PELVIS (SOFT TISSUE) . Pt reports rectal pain during menstruation for approximately the last year. Pt reports rectal bleeding. Pt denies constipation or diarrhea.        History of Present Illness   This is a very nice 29-year-old female who was referred to me from Dr. Mauricio of gynecology with concern for endometriosis with possible colorectal involvement.  Patient has noticed rectal pain and bleeding with bowel movements for the last year.  The symptoms only happen when the patient is menstruating.  Patient generally becomes constipated the week before her menstrual cycle.  When patient is not menstruating, she denies any rectal pain, bleeding or constipation.    Patient also has generalized severe pelvic pain during her menstrual cycles.  She is thought to have endometriosis and possible adenomyosis.  The symptoms have been refractory to medical therapy thus far.  Patient has noticed that when she has a bowel movement during her menstrual cycle, she will have occasions with severe pain causing vaginal discharge and bleeding after the bowel movement.  Patient has a history of 3 vaginal deliveries and denies any history of obstetric cares.  She denies any fecal incontinence.  Patient has had a colonoscopy around 10 years ago which was normal.  No family history of colon or rectal cancer.  No blood thinners.  No prior abdominal or anorectal surgery.    Allergies  Mariela is allergic to sulfa antibiotics.    Past Medical / Surgical / Social / Family History    The past medical and past surgical history have been reviewed by me today.    Past Medical History:    Allergic rhinitis     (normal spontaneous vaginal delivery) (HCC)    x3     Past Surgical History:    Procedure Laterality Date    Appendectomy  2011    Appendectomy  2011    Cholecystectomy      Colonoscopy      Laparoscopic cholecystectomy  2013          X3    Reconstr nose+markus septal repair         The family history and social history have been reviewed by me today.    Family History   Problem Relation Age of Onset    Hypertension Mother     Kidney Disease Mother     Diabetes Father     Hypertension Father     Dementia Maternal Grandmother     Diabetes Paternal Grandmother      Social History     Socioeconomic History    Marital status:    Tobacco Use    Smoking status: Never     Passive exposure: Never    Smokeless tobacco: Never   Vaping Use    Vaping status: Never Used   Substance and Sexual Activity    Alcohol use: Yes     Comment: Rare occasion, < 1/month    Drug use: Never    Sexual activity: Yes     Partners: Male   Other Topics Concern    Caffeine Concern No    Exercise Yes    Seat Belt Yes    Special Diet No    Stress Concern No    Weight Concern No        Current Outpatient Medications:     PEG 3350-KCl-Na Bicarb-NaCl (TRILYTE) 420 g Oral Recon Soln, Starting at 4:00 pm the night before procedure, drink 8 ounces of the prep every 15-20 minutes until finished, Disp: 1 each, Rfl: 0    metroNIDAZOLE 0.75 % Vaginal Gel, Place 5 g vaginally nightly., Disp: 70 g, Rfl: 0      Review of Systems  A 10 point review of systems was performed and negative unless otherwise documented per HPI.    Physical Findings   /73 (BP Location: Right arm, Patient Position: Sitting, Cuff Size: adult)   Pulse 68   Temp 98.3 °F (36.8 °C) (Temporal)   Resp 18   LMP 2024 (Approximate)   SpO2 100%   Physical Exam  Vitals and nursing note reviewed. Exam conducted with a chaperone present.   Constitutional:       General: She is not in acute distress.  HENT:      Head: Normocephalic and atraumatic.      Mouth/Throat:      Mouth: Mucous membranes are moist.   Cardiovascular:      Rate and Rhythm:  Normal rate and regular rhythm.   Pulmonary:      Effort: Pulmonary effort is normal.   Abdominal:      General: There is no distension.      Palpations: Abdomen is soft.      Tenderness: There is no abdominal tenderness.   Musculoskeletal:         General: No deformity.   Skin:     General: Skin is warm and dry.   Neurological:      General: No focal deficit present.      Mental Status: She is alert.   Psychiatric:         Mood and Affect: Mood normal.     I have personally reviewed the imaging of patient's recent MRI pelvis from 1/30/2025.  I agree with radiology conclusion as below    CONCLUSION:    1. Small amount of free fluid in the pelvis is likely physiologic.   2. Tortuosity the bilateral gonadal veins, worse on the left is nonspecific although may be seen with pelvic congestion.  Correlate clinically.        Assessment   1. Rectal pain    2. Rectal bleeding        This is a very nice 29-year-old female who was referred to me from Dr. Mauricio of gynecology with concern for endometriosis with possible colorectal involvement.  Patient has noticed rectal pain and bleeding with bowel movements for the last year.  The symptoms only happen when the patient is menstruating.  Patient generally becomes constipated the week before her menstrual cycle.  When patient is not menstruating, she denies any rectal pain, bleeding or constipation.    Patient also has generalized severe pelvic pain during her menstrual cycles.  She is thought to have endometriosis and possible adenomyosis.  The symptoms have been refractory to medical therapy thus far.  Patient has noticed that when she has a bowel movement during her menstrual cycle, she will have occasions with severe pain causing vaginal discharge and bleeding after the bowel movement.  Patient has a history of 3 vaginal deliveries and denies any history of obstetric cares.  She denies any fecal incontinence.  Patient has had a colonoscopy around 10 years ago which was  normal.  No family history of colon or rectal cancer.  No blood thinners.  No prior abdominal or anorectal surgery.    Patient is well-appearing on exam today.  Patient has had a recent MRI of the pelvis on 1/30/2025 which was normal aside from a small amount of free fluid in the pelvis thought to be physiologic and tortuosity of the bilateral gonadal veins, worse on the left.  This finding is nonspecific though may be seen with pelvic congestion per radiology.    Plan  I counseled patient that her symptoms are suspicious for possible colon or rectal involvement of endometriosis.  I would like to review the patient's recent MRI in detail to see if there is suggestion of bowel, colon or rectal involvement of endometriosis on this imaging.  I also recommend planning for a colonoscopy with digital rectal exam and bimanual pelvic examination under anesthesia to rule out any full-thickness colorectal endometriosis.  The details of the colonoscopy procedure were discussed including the prep instructions, risks, benefits and alternatives.  Patient expressed understanding and was agreeable to schedule a colonoscopy.  This has been scheduled for 4/1/2025 at [facility] under monitored anesthesia care.    After completion of the colonoscopy, careful review the MRI and discussion of the patient's case with Dr. Mauricio, I will coordinate care with Dr. Mauricio and determine my involvement for any upcoming surgical intervention in regards to the patient's endometriosis.     No orders of the defined types were placed in this encounter.      Imaging & Referrals   None    Follow Up  No follow-ups on file.    Jayro Lobo MD

## 2025-03-20 ENCOUNTER — APPOINTMENT (OUTPATIENT)
Dept: ADMINISTRATIVE | Facility: HOSPITAL | Age: 30
End: 2025-03-20
Payer: COMMERCIAL

## 2025-04-01 ENCOUNTER — HOSPITAL ENCOUNTER (OUTPATIENT)
Facility: HOSPITAL | Age: 30
Setting detail: HOSPITAL OUTPATIENT SURGERY
Discharge: HOME | End: 2025-04-01
Attending: STUDENT IN AN ORGANIZED HEALTH CARE EDUCATION/TRAINING PROGRAM | Admitting: STUDENT IN AN ORGANIZED HEALTH CARE EDUCATION/TRAINING PROGRAM
Payer: COMMERCIAL

## 2025-04-01 ENCOUNTER — ANESTHESIA (OUTPATIENT)
Dept: ENDOSCOPY | Facility: HOSPITAL | Age: 30
End: 2025-04-01
Payer: COMMERCIAL

## 2025-04-01 ENCOUNTER — ANESTHESIA EVENT (OUTPATIENT)
Dept: ENDOSCOPY | Facility: HOSPITAL | Age: 30
End: 2025-04-01
Payer: COMMERCIAL

## 2025-04-01 VITALS
TEMPERATURE: 98 F | WEIGHT: 130 LBS | OXYGEN SATURATION: 100 % | DIASTOLIC BLOOD PRESSURE: 66 MMHG | HEIGHT: 65 IN | BODY MASS INDEX: 21.66 KG/M2 | RESPIRATION RATE: 16 BRPM | HEART RATE: 80 BPM | SYSTOLIC BLOOD PRESSURE: 91 MMHG

## 2025-04-01 DIAGNOSIS — K62.89 RECTAL PAIN: ICD-10-CM

## 2025-04-01 DIAGNOSIS — K62.5 RECTAL BLEEDING: ICD-10-CM

## 2025-04-01 LAB — B-HCG UR QL: NEGATIVE

## 2025-04-01 PROCEDURE — 45378 DIAGNOSTIC COLONOSCOPY: CPT | Performed by: STUDENT IN AN ORGANIZED HEALTH CARE EDUCATION/TRAINING PROGRAM

## 2025-04-01 PROCEDURE — 0DJD8ZZ INSPECTION OF LOWER INTESTINAL TRACT, VIA NATURAL OR ARTIFICIAL OPENING ENDOSCOPIC: ICD-10-PCS | Performed by: STUDENT IN AN ORGANIZED HEALTH CARE EDUCATION/TRAINING PROGRAM

## 2025-04-01 RX ORDER — LIDOCAINE HYDROCHLORIDE 10 MG/ML
INJECTION, SOLUTION EPIDURAL; INFILTRATION; INTRACAUDAL; PERINEURAL AS NEEDED
Status: DISCONTINUED | OUTPATIENT
Start: 2025-04-01 | End: 2025-04-01 | Stop reason: SURG

## 2025-04-01 RX ORDER — SODIUM CHLORIDE, SODIUM LACTATE, POTASSIUM CHLORIDE, CALCIUM CHLORIDE 600; 310; 30; 20 MG/100ML; MG/100ML; MG/100ML; MG/100ML
INJECTION, SOLUTION INTRAVENOUS CONTINUOUS
Status: DISCONTINUED | OUTPATIENT
Start: 2025-04-01 | End: 2025-04-01

## 2025-04-01 RX ADMIN — LIDOCAINE HYDROCHLORIDE 50 MG: 10 INJECTION, SOLUTION EPIDURAL; INFILTRATION; INTRACAUDAL; PERINEURAL at 07:44:00

## 2025-04-01 RX ADMIN — SODIUM CHLORIDE, SODIUM LACTATE, POTASSIUM CHLORIDE, CALCIUM CHLORIDE: 600; 310; 30; 20 INJECTION, SOLUTION INTRAVENOUS at 07:42:00

## 2025-04-01 NOTE — ANESTHESIA POSTPROCEDURE EVALUATION
[facility]    Mariela Fontanez Patient Status:  Hospital Outpatient Surgery   Age/Gender 29 year old female MRN [id number]   Location [facility] Attending Jayro Lobo MD   Hosp Day # 0 PCP No primary care provider on file.       Anesthesia Post-op Note    COLONOSCOPY    Procedure Summary       Date: 04/01/25 Room / Location:  ENDOSCOPY 02 / [facility] END[facility]COPY    Anesthesia Start: 0742 Anesthesia Stop: 0833    Procedure: COLONOSCOPY Diagnosis:       Rectal pain      Rectal bleeding      (normal colon)    Surgeons: Jayro Lobo MD Anesthesiologist: Raheem Zarate MD    Anesthesia Type: MAC ASA Status: 1            Anesthesia Type: MAC    Vitals Value Taken Time   /65 04/01/25 0833   Temp avail 04/01/25 0833   Pulse 69 04/01/25 0833   Resp 16 04/01/25 0830   SpO2 100 % 04/01/25 0833   Vitals shown include unfiled device data.        Patient Location: Endoscopy    Anesthesia Type: MAC    Airway Patency: patent    Postop Pain Control: adequate    Mental Status: preanesthetic baseline    Nausea/Vomiting: none    Cardiopulmonary/Hydration status: stable euvolemic    Complications: no apparent anesthesia related complications    Postop vital signs: stable    Dental Exam: Unchanged from Preop    Patient to be discharged from PACU when criteria met.

## 2025-04-01 NOTE — OPERATIVE REPORT
[facility]  Operative Note    Mariela Fontanez Location: OR   CSN [id number] MRN [id number]    1995 Age 29 year old   Admission Date 2025 Operation Date 2025   Attending Physician No att. providers found Operating Physician Jayro Lobo MD   PCP No primary care provider on file.          Patient Name: Mariela Fontanez    Preoperative Diagnosis: Rectal pain [K62.89]  Rectal bleeding [K62.5]    Postoperative Diagnosis:   Normal colon and rectum    Primary Surgeon: Jayro Lobo MD    Anesthesia: MAC    Procedures: Colonoscopy to the cecum    Specimen:   None    Estimated Blood Loss: None    Complications: None immediate    Condition: Good    Indications for Surgery:   This is a very nice 29-year-old female who was referred to me from Dr. Mauricio of gynecology with concern for endometriosis with possible colorectal involvement.  Patient has noticed rectal pain and bleeding with bowel movements for the last year.  The symptoms only happen when the patient is menstruating.  Patient generally becomes constipated the week before her menstrual cycle.  When patient is not menstruating, she denies any rectal pain, bleeding or constipation.     Patient also has generalized severe pelvic pain during her menstrual cycles.  She is thought to have endometriosis and possible adenomyosis.  The symptoms have been refractory to medical therapy thus far.  Patient has noticed that when she has a bowel movement during her menstrual cycle, she will have occasions with severe pain causing vaginal discharge and bleeding after the bowel movement.  Patient has a history of 3 vaginal deliveries and denies any history of obstetric tears.  She denies any fecal incontinence.  Patient has had a colonoscopy around 10 years ago which was normal.  No family history of colon or rectal cancer.  No blood thinners.  No prior abdominal or anorectal surgery.     Patient is well-appearing on exam.  Patient has had a recent MRI of  the pelvis on 1/30/2025 which was normal aside from a small amount of free fluid in the pelvis thought to be physiologic and tortuosity of the bilateral gonadal veins, worse on the left.  This finding is nonspecific though may be seen with pelvic congestion per radiology.     I counseled patient that her symptoms are suspicious for possible colon or rectal involvement of endometriosis.  I would like to review the patient's recent MRI in detail to see if there is suggestion of bowel, colon or rectal involvement of endometriosis on this imaging.  I also recommend planning for a colonoscopy with digital rectal exam and bimanual pelvic examination under anesthesia to rule out any full-thickness colorectal endometriosis.  The details of the colonoscopy procedure were discussed including the prep instructions, risks, benefits and alternatives.  Patient expressed understanding and was agreeable to schedule a colonoscopy.  This has been scheduled for 4/1/2025 at [facility] under monitored anesthesia care.     After completion of the colonoscopy, careful review the MRI and discussion of the patient's case with Dr. Mauricio, I will coordinate care with Dr. Mauricio and determine my involvement for any upcoming surgical intervention in regards to the patient's endometriosis.    Patient presents for the colonoscopy procedure today.  She completed the bowel prep as instructed.  Consent was signed.  All questions answered.    Surgical Findings:   The quality of the bowel prep was fair.  There was stool staining throughout the colon.  Small polyps could have been missed.  There was mild distal proctitis likely due to the bowel prep.  Otherwise, the colon and rectum appeared pink and healthy without any masses, polyps or inflammatory changes.  No evidence of full-thickness endometriosis involving the colon or rectum.    Description of Procedure:   The patient was taken to the endoscopy suite and positioned in the left lateral  decubitus position with knees flexed. Monitored anesthesia care was administered. A time-out was performed.    The perineum and perianal skin were examined. A digital rectal examination was performed.  These were both normal.  There is no obvious nodularity in the rectovaginal septum.  A well-lubricated pediatric colonoscope was then inserted and carefully navigated to the cecum. CO2 insufflation was used throughout the procedure. Advancement was accomplished with some difficulty due to looping which was overcome through stiffening of the colonoscope and abdominal pressure. The appendiceal orifice and ileocecal valve were identified and photographed. The terminal ileum was not intubated. The scope was then withdrawn as the mucosa was circumferentially examined.     The quality of the bowel prep was fair.  There was stool staining throughout the colon.  Small polyps could have been missed.  There was mild distal proctitis likely due to the bowel prep.  Otherwise, the colon and rectum appeared pink and healthy without any masses, polyps or inflammatory changes.  No evidence of full-thickness endometriosis involving the colon or rectum. In the rectum, the scope was retroflexed to evaluate the anorectal junction.  The scope was straightened and excess gas was suctioned from the colon and the scope removed, terminating the procedure.    Anesthesia was terminated and the patient transported to the recovery unit in good condition. The patient tolerated the procedure well without apparent intraoperative complication.    Follow up:   Patient will need next colonoscopy at age 43 for screening purposes.  Patient should follow-up with Dr. Mauricio for ongoing care of her endometriosis.      Jayro Lobo MD  4/1/2025  9:06 AM

## 2025-04-01 NOTE — DISCHARGE INSTRUCTIONS
Home Care Instructions for Colonoscopy with Sedation    Diet:  - Resume your regular diet   - Start with light meals to minimize bloating.  - Do not drink alcohol today.    Medication:  - If you have questions about resuming your normal medications, please contact your Primary Care Physician.    Activities:  - Take it easy today. Do not return to work today.  - Do not drive today.  - Do not operate any machinery today (including kitchen equipment).    Colonoscopy:  - You may notice some rectal \"spotting\" (a little blood on the toilet tissue) for a day or two after the exam. This is normal.  - If you experience any rectal bleeding (not spotting), persistent tenderness or sharp severe abdominal pains, oral temperature over 100 degrees Fahrenheit, light-headedness or dizziness, or any other problems, contact your doctor.    **If unable to reach your doctor, please go to the [facility] Emergency Room**    - Your referring physician will receive a full report of your examination.  - If you do not hear from your doctor's office within two weeks of your biopsy, please call them for your results.

## 2025-04-01 NOTE — ANESTHESIA PREPROCEDURE EVALUATION
PRE-OP EVALUATION    Patient Name: Mariela Fontanez    Admit Diagnosis: Rectal pain [K62.89]  Rectal bleeding [K62.5]    Pre-op Diagnosis: Rectal pain [K62.89]  Rectal bleeding [K62.5]    COLONOSCOPY    Anesthesia Procedure: COLONOSCOPY    Surgeons and Role:     * Jayro Lobo MD - Primary    Pre-op vitals reviewed.        Body mass index is 21.63 kg/m².    Current medications reviewed.  Hospital Medications:   lactated ringers infusion   Intravenous Continuous       Outpatient Medications:   Prescriptions Prior to Admission[1]    Allergies: Sulfa antibiotics      Anesthesia Evaluation    Patient summary reviewed.    Anesthetic Complications  (-) history of anesthetic complications         GI/Hepatic/Renal    Negative GI/hepatic/renal ROS.                             Cardiovascular                                                       Endo/Other    Negative endo/other ROS.                              Pulmonary    Negative pulmonary ROS.                       Neuro/Psych    Negative neuro/psych ROS.                                  Past Surgical History:   Procedure Laterality Date    Appendectomy      Colonoscopy      Laparoscopic cholecystectomy            X3    Reconstr nose+markus septal repair  2014     Social History     Socioeconomic History    Marital status:    Tobacco Use    Smoking status: Never     Passive exposure: Never    Smokeless tobacco: Never   Vaping Use    Vaping status: Never Used   Substance and Sexual Activity    Alcohol use: Yes     Comment: Rare occasion, < 1/month    Drug use: Never    Sexual activity: Yes     Partners: Male   Other Topics Concern    Caffeine Concern No    Exercise Yes    Seat Belt Yes    Special Diet No    Stress Concern No    Weight Concern No     History   Drug Use Unknown     Available pre-op labs reviewed.               Airway      Mallampati: III  Mouth opening: 3 FB  TM distance: 4 - 6 cm   Cardiovascular             Dental  Comment: No loose  teeth per patient               Pulmonary                     Other findings              ASA: 1   Plan: MAC  NPO status verified and     Post-procedure pain management plan discussed with surgeon and patient.    Comment: MAC discussed in detail, as well as, possible conversion to GETA.  Risk of sore throat, cough, dental trauma, PONV discussed.  All questions answered    Plan/risks discussed with: patient                Present on Admission:  **None**             [1]   Medications Prior to Admission   Medication Sig Dispense Refill Last Dose/Taking    PEG 3350-KCl-Na Bicarb-NaCl (TRILYTE) 420 g Oral Recon Soln Starting at 4:00 pm the night before procedure, drink 8 ounces of the prep every 15-20 minutes until finished 1 each 0     metroNIDAZOLE 0.75 % Vaginal Gel Place 5 g vaginally nightly. 70 g 0

## 2025-05-20 ENCOUNTER — LAB ENCOUNTER (OUTPATIENT)
Dept: LAB | Age: 30
End: 2025-05-20
Attending: OBSTETRICS & GYNECOLOGY
Payer: COMMERCIAL

## 2025-05-20 DIAGNOSIS — Z01.818 PRE-PROCEDURAL EXAMINATION: Primary | ICD-10-CM

## 2025-05-20 LAB
ALBUMIN SERPL-MCNC: 4.9 G/DL (ref 3.2–4.8)
ALBUMIN/GLOB SERPL: 1.8 {RATIO} (ref 1–2)
ALP LIVER SERPL-CCNC: 56 U/L (ref 37–98)
ALT SERPL-CCNC: 24 U/L (ref 10–49)
ANION GAP SERPL CALC-SCNC: 4 MMOL/L (ref 0–18)
AST SERPL-CCNC: 23 U/L (ref ?–34)
B-HCG SERPL-ACNC: <2.6 MIU/ML (ref ?–4.2)
BASOPHILS # BLD AUTO: 0.06 X10(3) UL (ref 0–0.2)
BASOPHILS NFR BLD AUTO: 0.9 %
BILIRUB SERPL-MCNC: 0.5 MG/DL (ref 0.3–1.2)
BUN BLD-MCNC: 16 MG/DL (ref 9–23)
CALCIUM BLD-MCNC: 10 MG/DL (ref 8.7–10.6)
CHLORIDE SERPL-SCNC: 107 MMOL/L (ref 98–112)
CO2 SERPL-SCNC: 29 MMOL/L (ref 21–32)
CREAT BLD-MCNC: 0.89 MG/DL (ref 0.55–1.02)
EGFRCR SERPLBLD CKD-EPI 2021: 89 ML/MIN/1.73M2 (ref 60–?)
EOSINOPHIL # BLD AUTO: 0.17 X10(3) UL (ref 0–0.7)
EOSINOPHIL NFR BLD AUTO: 2.7 %
ERYTHROCYTE [DISTWIDTH] IN BLOOD BY AUTOMATED COUNT: 13.1 %
FASTING STATUS PATIENT QL REPORTED: NO
GLOBULIN PLAS-MCNC: 2.7 G/DL (ref 2–3.5)
GLUCOSE BLD-MCNC: 90 MG/DL (ref 70–99)
HBV SURFACE AG SER-ACNC: <0.1 [IU]/L
HBV SURFACE AG SERPL QL IA: NONREACTIVE
HCT VFR BLD AUTO: 42.8 % (ref 35–48)
HCV AB SERPL QL IA: NONREACTIVE
HGB BLD-MCNC: 14 G/DL (ref 12–16)
IMM GRANULOCYTES # BLD AUTO: 0.01 X10(3) UL (ref 0–1)
IMM GRANULOCYTES NFR BLD: 0.2 %
LYMPHOCYTES # BLD AUTO: 2.13 X10(3) UL (ref 1–4)
LYMPHOCYTES NFR BLD AUTO: 33.5 %
MCH RBC QN AUTO: 28.6 PG (ref 26–34)
MCHC RBC AUTO-ENTMCNC: 32.7 G/DL (ref 31–37)
MCV RBC AUTO: 87.3 FL (ref 80–100)
MONOCYTES # BLD AUTO: 0.44 X10(3) UL (ref 0.1–1)
MONOCYTES NFR BLD AUTO: 6.9 %
NEUTROPHILS # BLD AUTO: 3.55 X10 (3) UL (ref 1.5–7.7)
NEUTROPHILS # BLD AUTO: 3.55 X10(3) UL (ref 1.5–7.7)
NEUTROPHILS NFR BLD AUTO: 55.8 %
OSMOLALITY SERPL CALC.SUM OF ELEC: 291 MOSM/KG (ref 275–295)
PLATELET # BLD AUTO: 241 10(3)UL (ref 150–450)
POTASSIUM SERPL-SCNC: 3.9 MMOL/L (ref 3.5–5.1)
PROT SERPL-MCNC: 7.6 G/DL (ref 5.7–8.2)
RBC # BLD AUTO: 4.9 X10(6)UL (ref 3.8–5.3)
SODIUM SERPL-SCNC: 140 MMOL/L (ref 136–145)
WBC # BLD AUTO: 6.4 X10(3) UL (ref 4–11)

## 2025-05-20 PROCEDURE — 80053 COMPREHEN METABOLIC PANEL: CPT

## 2025-05-20 PROCEDURE — 87340 HEPATITIS B SURFACE AG IA: CPT

## 2025-05-20 PROCEDURE — 87389 HIV-1 AG W/HIV-1&-2 AB AG IA: CPT

## 2025-05-20 PROCEDURE — 85025 COMPLETE CBC W/AUTO DIFF WBC: CPT

## 2025-05-20 PROCEDURE — 36415 COLL VENOUS BLD VENIPUNCTURE: CPT

## 2025-05-20 PROCEDURE — 86803 HEPATITIS C AB TEST: CPT

## 2025-05-20 PROCEDURE — 84702 CHORIONIC GONADOTROPIN TEST: CPT

## 2025-05-23 ENCOUNTER — TELEPHONE (OUTPATIENT)
Facility: LOCATION | Age: 30
End: 2025-05-23

## 2025-05-23 DIAGNOSIS — K62.89 RECTAL PAIN: Primary | ICD-10-CM

## 2025-05-28 RX ORDER — METRONIDAZOLE 500 MG/100ML
500 INJECTION, SOLUTION INTRAVENOUS ONCE
Status: COMPLETED | OUTPATIENT
Start: 2025-05-29 | End: 2025-05-29

## 2025-05-29 ENCOUNTER — HOSPITAL ENCOUNTER (OUTPATIENT)
Facility: HOSPITAL | Age: 30
Discharge: HOME | End: 2025-05-30
Attending: OBSTETRICS & GYNECOLOGY | Admitting: OBSTETRICS & GYNECOLOGY
Payer: COMMERCIAL

## 2025-05-29 ENCOUNTER — ANESTHESIA (OUTPATIENT)
Dept: SURGERY | Facility: HOSPITAL | Age: 30
End: 2025-05-29
Payer: COMMERCIAL

## 2025-05-29 ENCOUNTER — ANESTHESIA EVENT (OUTPATIENT)
Dept: SURGERY | Facility: HOSPITAL | Age: 30
End: 2025-05-29
Payer: COMMERCIAL

## 2025-05-29 DIAGNOSIS — N94.6 DYSMENORRHEA: ICD-10-CM

## 2025-05-29 DIAGNOSIS — N92.0 MENORRHAGIA: Primary | ICD-10-CM

## 2025-05-29 LAB
B-HCG UR QL: NEGATIVE
ERYTHROCYTE [DISTWIDTH] IN BLOOD BY AUTOMATED COUNT: 13 %
HCT VFR BLD AUTO: 35.8 % (ref 35–48)
HGB BLD-MCNC: 12.1 G/DL (ref 12–16)
MCH RBC QN AUTO: 29.2 PG (ref 26–34)
MCHC RBC AUTO-ENTMCNC: 33.8 G/DL (ref 31–37)
MCV RBC AUTO: 86.3 FL (ref 80–100)
PLATELET # BLD AUTO: 142 10(3)UL (ref 150–450)
RBC # BLD AUTO: 4.15 X10(6)UL (ref 3.8–5.3)
WBC # BLD AUTO: 7.1 X10(3) UL (ref 4–11)

## 2025-05-29 PROCEDURE — 85014 HEMATOCRIT: CPT | Performed by: OBSTETRICS & GYNECOLOGY

## 2025-05-29 PROCEDURE — 85018 HEMOGLOBIN: CPT | Performed by: OBSTETRICS & GYNECOLOGY

## 2025-05-29 PROCEDURE — 85027 COMPLETE CBC AUTOMATED: CPT | Performed by: OBSTETRICS & GYNECOLOGY

## 2025-05-29 PROCEDURE — 81025 URINE PREGNANCY TEST: CPT

## 2025-05-29 PROCEDURE — 88305 TISSUE EXAM BY PATHOLOGIST: CPT | Performed by: OBSTETRICS & GYNECOLOGY

## 2025-05-29 RX ORDER — HYDROCODONE BITARTRATE AND ACETAMINOPHEN 5; 325 MG/1; MG/1
2 TABLET ORAL EVERY 6 HOURS PRN
Status: DISCONTINUED | OUTPATIENT
Start: 2025-05-29 | End: 2025-05-30

## 2025-05-29 RX ORDER — HYDROMORPHONE HYDROCHLORIDE 1 MG/ML
0.4 INJECTION, SOLUTION INTRAMUSCULAR; INTRAVENOUS; SUBCUTANEOUS EVERY 5 MIN PRN
Status: DISCONTINUED | OUTPATIENT
Start: 2025-05-29 | End: 2025-05-29 | Stop reason: HOSPADM

## 2025-05-29 RX ORDER — HYDROMORPHONE HYDROCHLORIDE 1 MG/ML
0.2 INJECTION, SOLUTION INTRAMUSCULAR; INTRAVENOUS; SUBCUTANEOUS EVERY 5 MIN PRN
Status: DISCONTINUED | OUTPATIENT
Start: 2025-05-29 | End: 2025-05-29 | Stop reason: HOSPADM

## 2025-05-29 RX ORDER — HYDROCODONE BITARTRATE AND ACETAMINOPHEN 5; 325 MG/1; MG/1
1 TABLET ORAL ONCE AS NEEDED
Status: DISCONTINUED | OUTPATIENT
Start: 2025-05-29 | End: 2025-05-29 | Stop reason: HOSPADM

## 2025-05-29 RX ORDER — HYDROMORPHONE HYDROCHLORIDE 1 MG/ML
0.6 INJECTION, SOLUTION INTRAMUSCULAR; INTRAVENOUS; SUBCUTANEOUS EVERY 5 MIN PRN
Status: DISCONTINUED | OUTPATIENT
Start: 2025-05-29 | End: 2025-05-29 | Stop reason: HOSPADM

## 2025-05-29 RX ORDER — ONDANSETRON 8 MG/1
8 TABLET, ORALLY DISINTEGRATING ORAL EVERY 8 HOURS PRN
Qty: 10 TABLET | Refills: 0 | Status: SHIPPED | OUTPATIENT
Start: 2025-05-29

## 2025-05-29 RX ORDER — GLYCOPYRROLATE 0.2 MG/ML
INJECTION, SOLUTION INTRAMUSCULAR; INTRAVENOUS AS NEEDED
Status: DISCONTINUED | OUTPATIENT
Start: 2025-05-29 | End: 2025-05-29 | Stop reason: SURG

## 2025-05-29 RX ORDER — ONDANSETRON 4 MG/1
4 TABLET, FILM COATED ORAL EVERY 8 HOURS PRN
Status: DISCONTINUED | OUTPATIENT
Start: 2025-05-29 | End: 2025-05-30

## 2025-05-29 RX ORDER — SCOPOLAMINE 1 MG/3D
1 PATCH, EXTENDED RELEASE TRANSDERMAL ONCE
Status: DISCONTINUED | OUTPATIENT
Start: 2025-05-29 | End: 2025-05-29 | Stop reason: HOSPADM

## 2025-05-29 RX ORDER — KETOROLAC TROMETHAMINE 30 MG/ML
INJECTION, SOLUTION INTRAMUSCULAR; INTRAVENOUS AS NEEDED
Status: DISCONTINUED | OUTPATIENT
Start: 2025-05-29 | End: 2025-05-29 | Stop reason: SURG

## 2025-05-29 RX ORDER — SODIUM CHLORIDE, SODIUM LACTATE, POTASSIUM CHLORIDE, CALCIUM CHLORIDE 600; 310; 30; 20 MG/100ML; MG/100ML; MG/100ML; MG/100ML
INJECTION, SOLUTION INTRAVENOUS CONTINUOUS
Status: DISCONTINUED | OUTPATIENT
Start: 2025-05-29 | End: 2025-05-30

## 2025-05-29 RX ORDER — HYDROCODONE BITARTRATE AND ACETAMINOPHEN 5; 325 MG/1; MG/1
2 TABLET ORAL ONCE AS NEEDED
Status: DISCONTINUED | OUTPATIENT
Start: 2025-05-29 | End: 2025-05-29 | Stop reason: HOSPADM

## 2025-05-29 RX ORDER — PHENAZOPYRIDINE HYDROCHLORIDE 200 MG/1
200 TABLET, FILM COATED ORAL ONCE
Status: COMPLETED | OUTPATIENT
Start: 2025-05-29 | End: 2025-05-29

## 2025-05-29 RX ORDER — HYDROCODONE BITARTRATE AND ACETAMINOPHEN 5; 325 MG/1; MG/1
1-2 TABLET ORAL EVERY 4 HOURS PRN
Qty: 12 TABLET | Refills: 0 | Status: SHIPPED | OUTPATIENT
Start: 2025-05-29

## 2025-05-29 RX ORDER — DEXAMETHASONE SODIUM PHOSPHATE 4 MG/ML
VIAL (ML) INJECTION AS NEEDED
Status: DISCONTINUED | OUTPATIENT
Start: 2025-05-29 | End: 2025-05-29 | Stop reason: SURG

## 2025-05-29 RX ORDER — ONDANSETRON 2 MG/ML
4 INJECTION INTRAMUSCULAR; INTRAVENOUS EVERY 8 HOURS PRN
Status: DISCONTINUED | OUTPATIENT
Start: 2025-05-29 | End: 2025-05-30

## 2025-05-29 RX ORDER — ACETAMINOPHEN 325 MG/1
650 TABLET ORAL EVERY 6 HOURS PRN
Status: DISCONTINUED | OUTPATIENT
Start: 2025-05-29 | End: 2025-05-30

## 2025-05-29 RX ORDER — ONDANSETRON 2 MG/ML
INJECTION INTRAMUSCULAR; INTRAVENOUS AS NEEDED
Status: DISCONTINUED | OUTPATIENT
Start: 2025-05-29 | End: 2025-05-29 | Stop reason: SURG

## 2025-05-29 RX ORDER — NEOSTIGMINE METHYLSULFATE 1 MG/ML
INJECTION INTRAVENOUS AS NEEDED
Status: DISCONTINUED | OUTPATIENT
Start: 2025-05-29 | End: 2025-05-29 | Stop reason: SURG

## 2025-05-29 RX ORDER — ROCURONIUM BROMIDE 10 MG/ML
INJECTION, SOLUTION INTRAVENOUS AS NEEDED
Status: DISCONTINUED | OUTPATIENT
Start: 2025-05-29 | End: 2025-05-29 | Stop reason: SURG

## 2025-05-29 RX ORDER — IBUPROFEN 600 MG/1
600 TABLET, FILM COATED ORAL EVERY 6 HOURS PRN
Qty: 30 TABLET | Refills: 0 | Status: SHIPPED | OUTPATIENT
Start: 2025-05-29

## 2025-05-29 RX ORDER — MEPERIDINE HYDROCHLORIDE 25 MG/ML
INJECTION INTRAMUSCULAR; INTRAVENOUS; SUBCUTANEOUS
Status: COMPLETED
Start: 2025-05-29 | End: 2025-05-29

## 2025-05-29 RX ORDER — ONDANSETRON 2 MG/ML
4 INJECTION INTRAMUSCULAR; INTRAVENOUS EVERY 6 HOURS PRN
Status: DISCONTINUED | OUTPATIENT
Start: 2025-05-29 | End: 2025-05-29 | Stop reason: HOSPADM

## 2025-05-29 RX ORDER — HYDROMORPHONE HYDROCHLORIDE 1 MG/ML
INJECTION, SOLUTION INTRAMUSCULAR; INTRAVENOUS; SUBCUTANEOUS
Status: COMPLETED
Start: 2025-05-29 | End: 2025-05-29

## 2025-05-29 RX ORDER — ACETAMINOPHEN 500 MG
1000 TABLET ORAL ONCE AS NEEDED
Status: DISCONTINUED | OUTPATIENT
Start: 2025-05-29 | End: 2025-05-29 | Stop reason: HOSPADM

## 2025-05-29 RX ORDER — DIPHENHYDRAMINE HYDROCHLORIDE 50 MG/ML
12.5 INJECTION, SOLUTION INTRAMUSCULAR; INTRAVENOUS EVERY 4 HOURS PRN
Status: DISCONTINUED | OUTPATIENT
Start: 2025-05-29 | End: 2025-05-30

## 2025-05-29 RX ORDER — DIPHENHYDRAMINE HYDROCHLORIDE 50 MG/ML
12.5 INJECTION, SOLUTION INTRAMUSCULAR; INTRAVENOUS AS NEEDED
Status: DISCONTINUED | OUTPATIENT
Start: 2025-05-29 | End: 2025-05-29

## 2025-05-29 RX ORDER — ACETAMINOPHEN 500 MG
1000 TABLET ORAL ONCE
Status: DISCONTINUED | OUTPATIENT
Start: 2025-05-29 | End: 2025-05-29 | Stop reason: HOSPADM

## 2025-05-29 RX ORDER — MEPERIDINE HYDROCHLORIDE 25 MG/ML
12.5 INJECTION INTRAMUSCULAR; INTRAVENOUS; SUBCUTANEOUS AS NEEDED
Status: COMPLETED | OUTPATIENT
Start: 2025-05-29 | End: 2025-05-29

## 2025-05-29 RX ORDER — SENNA AND DOCUSATE SODIUM 50; 8.6 MG/1; MG/1
1 TABLET, FILM COATED ORAL DAILY PRN
Qty: 30 TABLET | Refills: 0 | Status: SHIPPED | OUTPATIENT
Start: 2025-05-29

## 2025-05-29 RX ORDER — LABETALOL HYDROCHLORIDE 5 MG/ML
5 INJECTION, SOLUTION INTRAVENOUS EVERY 5 MIN PRN
Status: DISCONTINUED | OUTPATIENT
Start: 2025-05-29 | End: 2025-05-29 | Stop reason: HOSPADM

## 2025-05-29 RX ORDER — BUPIVACAINE HYDROCHLORIDE 5 MG/ML
INJECTION, SOLUTION EPIDURAL; INTRACAUDAL; PERINEURAL AS NEEDED
Status: DISCONTINUED | OUTPATIENT
Start: 2025-05-29 | End: 2025-05-29 | Stop reason: HOSPADM

## 2025-05-29 RX ORDER — MIDAZOLAM HYDROCHLORIDE 1 MG/ML
INJECTION INTRAMUSCULAR; INTRAVENOUS AS NEEDED
Status: DISCONTINUED | OUTPATIENT
Start: 2025-05-29 | End: 2025-05-29 | Stop reason: SURG

## 2025-05-29 RX ORDER — PROCHLORPERAZINE EDISYLATE 5 MG/ML
5 INJECTION INTRAMUSCULAR; INTRAVENOUS EVERY 8 HOURS PRN
Status: DISCONTINUED | OUTPATIENT
Start: 2025-05-29 | End: 2025-05-29 | Stop reason: HOSPADM

## 2025-05-29 RX ORDER — HYDROCODONE BITARTRATE AND ACETAMINOPHEN 5; 325 MG/1; MG/1
1 TABLET ORAL EVERY 6 HOURS PRN
Status: DISCONTINUED | OUTPATIENT
Start: 2025-05-29 | End: 2025-05-30

## 2025-05-29 RX ORDER — IBUPROFEN 600 MG/1
600 TABLET, FILM COATED ORAL EVERY 6 HOURS
Status: DISCONTINUED | OUTPATIENT
Start: 2025-05-29 | End: 2025-05-30

## 2025-05-29 RX ORDER — LIDOCAINE HYDROCHLORIDE 10 MG/ML
INJECTION, SOLUTION EPIDURAL; INFILTRATION; INTRACAUDAL; PERINEURAL AS NEEDED
Status: DISCONTINUED | OUTPATIENT
Start: 2025-05-29 | End: 2025-05-29 | Stop reason: SURG

## 2025-05-29 RX ORDER — NALOXONE HYDROCHLORIDE 0.4 MG/ML
80 INJECTION, SOLUTION INTRAMUSCULAR; INTRAVENOUS; SUBCUTANEOUS AS NEEDED
Status: DISCONTINUED | OUTPATIENT
Start: 2025-05-29 | End: 2025-05-29 | Stop reason: HOSPADM

## 2025-05-29 RX ADMIN — MIDAZOLAM HYDROCHLORIDE 2 MG: 1 INJECTION INTRAMUSCULAR; INTRAVENOUS at 12:48:00

## 2025-05-29 RX ADMIN — METRONIDAZOLE 500 MG: 500 INJECTION, SOLUTION INTRAVENOUS at 13:03:00

## 2025-05-29 RX ADMIN — NEOSTIGMINE METHYLSULFATE 5 MG: 1 INJECTION INTRAVENOUS at 14:15:00

## 2025-05-29 RX ADMIN — GLYCOPYRROLATE 0.8 MG: 0.2 INJECTION, SOLUTION INTRAMUSCULAR; INTRAVENOUS at 14:15:00

## 2025-05-29 RX ADMIN — SODIUM CHLORIDE, SODIUM LACTATE, POTASSIUM CHLORIDE, CALCIUM CHLORIDE: 600; 310; 30; 20 INJECTION, SOLUTION INTRAVENOUS at 14:03:00

## 2025-05-29 RX ADMIN — ONDANSETRON 4 MG: 2 INJECTION INTRAMUSCULAR; INTRAVENOUS at 14:08:00

## 2025-05-29 RX ADMIN — ROCURONIUM BROMIDE 50 MG: 10 INJECTION, SOLUTION INTRAVENOUS at 12:51:00

## 2025-05-29 RX ADMIN — LIDOCAINE HYDROCHLORIDE 100 MG: 10 INJECTION, SOLUTION EPIDURAL; INFILTRATION; INTRACAUDAL; PERINEURAL at 12:51:00

## 2025-05-29 RX ADMIN — SODIUM CHLORIDE, SODIUM LACTATE, POTASSIUM CHLORIDE, CALCIUM CHLORIDE: 600; 310; 30; 20 INJECTION, SOLUTION INTRAVENOUS at 12:48:00

## 2025-05-29 RX ADMIN — KETOROLAC TROMETHAMINE 30 MG: 30 INJECTION, SOLUTION INTRAMUSCULAR; INTRAVENOUS at 14:08:00

## 2025-05-29 RX ADMIN — DEXAMETHASONE SODIUM PHOSPHATE 8 MG: 4 MG/ML VIAL (ML) INJECTION at 14:08:00

## 2025-05-29 NOTE — ANESTHESIA POSTPROCEDURE EVALUATION
[facility]    Mariela Fontanez Patient Status:  Hospital Outpatient Surgery   Age/Gender 29 year old female MRN [id number]   Location [facility] POST ANESTHESIA CARE UNIT Attending Matilde Mauricio MD   Hosp Day # 0 PCP No primary care provider on file.       Anesthesia Post-op Note    TOTAL LAPAROSCOPIC HYSTERECTOMY  BILATERAL SALPINGECTOMY, CYSTOSCOPY, EXCISION OF ENDOMETRIOSIS,LEFT URETERALYSIS    Procedure Summary       Date: 05/29/25 Room / Location:  MAIN OR [address] [place] [address] MAIN OR    Anesthesia Start: 1248 Anesthesia Stop: 1435    Procedure: TOTAL LAPAROSCOPIC HYSTERECTOMY BILATERAL SALPINGECTOMY, CYSTOSCOPY, EXCISION OF ENDOMETRIOSIS,LEFT URETERALYSIS (Bilateral) Diagnosis: (DYSMENORRHEA,MENORRHAGIA)    Surgeons: Matilde Mauricio MD Anesthesiologist: Armando Russell MD    Anesthesia Type: general ASA Status: Not recorded            Anesthesia Type: general    Vitals Value Taken Time   /77 05/29/25 14:46   Temp 97.9 °F (36.6 °C) 05/29/25 14:36   Pulse 72 05/29/25 14:50   Resp 16 05/29/25 14:50   SpO2 100 % 05/29/25 14:50   Vitals shown include unfiled device data.        Patient Location: PACU    Anesthesia Type: general    Airway Patency: patent and extubated    Postop Pain Control: adequate    Mental Status: mildly sedated but able to meaningfully participate in the post-anesthesia evaluation    Nausea/Vomiting: none    Cardiopulmonary/Hydration status: stable euvolemic    Complications: no apparent anesthesia related complications    Postop vital signs: stable    Dental Exam: Unchanged from Preop    Patient to be discharged from PACU when criteria met.

## 2025-05-29 NOTE — ANESTHESIA PROCEDURE NOTES
Airway  Date/Time: 5/29/2025 12:51 PM  Reason: elective    Airway not difficult    General Information and Staff   Patient location during procedure: OR  Anesthesiologist: Armando Russell MD  Performed: anesthesiologist   Performed by: Armando Russell MD  Authorized by: Armando Russell MD        Indications and Patient Condition  Indications for airway management: anesthesia  Sedation level: deep      Preoxygenated: yesPatient position: sniffing    Mask difficulty assessment: 1 - vent by mask    Final Airway Details    Final airway type: endotracheal airway    Successful airway: ETT  Cuffed: yes   Successful intubation technique: direct laryngoscopy  Endotracheal tube insertion site: oral  Blade: Eileen  Blade size: #3  ETT size (mm): 7.0    Cormack-Lehane Classification: grade IIA - partial view of glottis  Placement verified by: capnometry   Cuff volume (mL): 8  Measured from: lips  ETT to lips (cm): 22  Number of attempts at approach: 1  Number of other approaches attempted: 0

## 2025-05-29 NOTE — ANESTHESIA PREPROCEDURE EVALUATION
PRE-OP EVALUATION    Patient Name: Mariela Fontanez    Admit Diagnosis: DYSMENORRHEA,MENORRHAGIA    Pre-op Diagnosis: DYSMENORRHEA,MENORRHAGIA    TOTAL LAPAROSCOPIC HYSTERECTOMY  BILATERAL SALPINGECTOMY, CYSTOSCOPY, EXCISION OF ENDOMETRIOSIS,LEFT URETERALYSIS    Anesthesia Procedure: TOTAL LAPAROSCOPIC HYSTERECTOMY BILATERAL SALPINGECTOMY, CYSTOSCOPY, EXCISION OF ENDOMETRIOSIS,LEFT URETERALYSIS (Bilateral)    Surgeons and Role:     * Matilde Mauricio MD - Primary    Pre-op vitals reviewed.  Temp: 97.9 °F (36.6 °C)  Pulse: 68  Resp: 14  BP: 117/77  SpO2: 100 %  Body mass index is 21.28 kg/m².    Current medications reviewed.  Hospital Medications:  Current Medications[1]    Outpatient Medications:   Prescriptions Prior to Admission[2]    Allergies: Sulfa antibiotics      Anesthesia Evaluation    Patient summary reviewed.    Anesthetic Complications  (-) history of anesthetic complications         GI/Hepatic/Renal    Negative GI/hepatic/renal ROS.                             Cardiovascular    Negative cardiovascular ROS.    Exercise tolerance: good     MET: >4                             (-) angina     (-) NEUMANN  (-) orthopnea  (-) PND     Endo/Other    Negative endo/other ROS.  (-) diabetes         (-) anemia                   Pulmonary    Negative pulmonary ROS.           (-) shortness of breath  (-) recent URI          Neuro/Psych    Negative neuro/psych ROS.                                  Past Surgical History[3]  Social Hx on file[4]  History   Drug Use Unknown     Available pre-op labs reviewed.  Lab Results   Component Value Date    WBC 6.4 05/20/2025    RBC 4.90 05/20/2025    HGB 14.0 05/20/2025    HCT 42.8 05/20/2025    MCV 87.3 05/20/2025    MCH 28.6 05/20/2025    MCHC 32.7 05/20/2025    RDW 13.1 05/20/2025    .0 05/20/2025     Lab Results   Component Value Date     05/20/2025    K 3.9 05/20/2025     05/20/2025    CO2 29.0 05/20/2025    BUN 16 05/20/2025    CREATSERUM 0.89 05/20/2025     GLU 90 05/20/2025    CA 10.0 05/20/2025            Airway      Mallampati: I  Mouth opening: 3 FB  TM distance: 4 - 6 cm  Neck ROM: full Cardiovascular    Cardiovascular exam normal.  Rhythm: regular  Rate: normal  (-) murmur   Dental    Dentition appears grossly intact         Pulmonary    Pulmonary exam normal.  Breath sounds clear to auscultation bilaterally.        (-) wheezes       Other findings              ASA: 1   Plan: general  NPO status verified and patient meets guidelines.          Plan/risks discussed with: patient  Use of blood product(s) discussed with: patient    Consented to blood products.          We discussed GA w/ETT and possible scratchy throat and rarely dental damage.  We discussed analgesic plan and PONV prophylaxis.  The patient's questions were answered and consent was attained.          [1]    lactated ringers infusion   Intravenous Continuous    [COMPLETED] phenazopyridine (Pyridium) tab 200 mg  200 mg Oral Once    lactated ringers infusion   Intravenous Continuous    lactated ringers IV bolus 500 mL  500 mL Intravenous Once PRN    acetaminophen (Tylenol Extra Strength) tab 1,000 mg  1,000 mg Oral Once PRN    Or    HYDROcodone-acetaminophen (Norco) 5-325 MG per tab 1 tablet  1 tablet Oral Once PRN    Or    HYDROcodone-acetaminophen (Norco) 5-325 MG per tab 2 tablet  2 tablet Oral Once PRN    atropine 0.1 MG/ML injection 0.5 mg  0.5 mg Intravenous PRN    labetalol (Trandate) 5 mg/mL injection 5 mg  5 mg Intravenous Q5 Min PRN    ondansetron (Zofran) 4 MG/2ML injection 4 mg  4 mg Intravenous Q6H PRN    prochlorperazine (Compazine) 10 MG/2ML injection 5 mg  5 mg Intravenous Q8H PRN    naloxone (Narcan) 0.4 MG/ML injection 80 mcg  80 mcg Intravenous PRN    diphenhydrAMINE (Benadryl) 50 mg/mL  injection 12.5 mg  12.5 mg Intravenous PRN    meperidine (Demerol) 25 MG/ML injection 12.5 mg  12.5 mg Intravenous PRN    HYDROmorphone (Dilaudid) 1 MG/ML injection 0.2 mg  0.2 mg Intravenous Q5 Min  PRN    Or    HYDROmorphone (Dilaudid) 1 MG/ML injection 0.4 mg  0.4 mg Intravenous Q5 Min PRN    Or    HYDROmorphone (Dilaudid) 1 MG/ML injection 0.6 mg  0.6 mg Intravenous Q5 Min PRN    fentaNYL (Sublimaze) 50 mcg/mL injection 25 mcg  25 mcg Intravenous Q5 Min PRN    Or    fentaNYL (Sublimaze) 50 mcg/mL injection 50 mcg  50 mcg Intravenous Q5 Min PRN    [COMPLETED] fentaNYL (Sublimaze) 50 mcg/mL injection        [COMPLETED] meperidine (Demerol) 25 MG/ML injection        [COMPLETED] metroNIDAZOLE in sodium chloride 0.79% (Flagyl) 5 mg/mL IVPB premix 500 mg  500 mg Intravenous Once   [2]   Medications Prior to Admission   Medication Sig Dispense Refill Last Dose/Taking    PEG 3350-KCl-Na Bicarb-NaCl (TRILYTE) 420 g Oral Recon Soln Starting at 4:00 pm the night before procedure, drink 8 ounces of the prep every 15-20 minutes until finished (Patient not taking: Reported on 5/21/2025) 1 each 0 Not Taking   [3]   Past Surgical History:  Procedure Laterality Date    Appendectomy  2011    Colonoscopy N/A 04/01/2025    Procedure: COLONOSCOPY;  Surgeon: Jayro Lobo MD;  Location:  ENDOSCOPY    Laparoscopic cholecystectomy  2013    Reconstr nose+markus septal repair  2014   [4]   Social History  Socioeconomic History    Marital status:    Tobacco Use    Smoking status: Never     Passive exposure: Never    Smokeless tobacco: Never   Vaping Use    Vaping status: Never Used   Substance and Sexual Activity    Alcohol use: Yes     Comment: Rare occasion, < 1/month    Drug use: Never    Sexual activity: Yes     Partners: Male   Other Topics Concern    Caffeine Concern No    Exercise Yes    Seat Belt Yes    Special Diet No    Stress Concern No    Weight Concern No

## 2025-05-29 NOTE — H&P
[facility]   part of [facility]    History and Physical    Mariela Fontanez Patient Status:  Hospital Outpatient Surgery    1995 MRN [id number]   Location [facility] SURGERY Attending Matilde Mauricio MD   Hosp Day # 0 PCP No primary care provider on file.     Date:  2025  Date of Admission:  (Not on file)    History provided by:patient  HPI:   No chief complaint on file.    Patient is a 28 yo  with pelvic pain and menorrhagia here for definitive surgical management.        History   Past Medical History[1]  Past Surgical History[2]  Family History[3]  Social History:  Short Social Hx on File[4]  Allergies/Medications:   Allergies: Allergies[5]  Prescriptions Prior to Admission[6]    Review of Systems:   Review of Systems    Physical Exam:   Vital Signs:  Height 5' 5\" (1.651 m), weight 130 lb (59 kg), last menstrual period 2025, not currently breastfeeding.  Physical Exam  Constitutional:       Appearance: Normal appearance. She is normal weight.   HENT:      Head: Normocephalic and atraumatic.   Cardiovascular:      Rate and Rhythm: Normal rate.      Pulses: Normal pulses.   Pulmonary:      Effort: Pulmonary effort is normal.   Skin:     General: Skin is warm and dry.   Neurological:      General: No focal deficit present.      Mental Status: She is alert and oriented to person, place, and time.   Psychiatric:         Mood and Affect: Mood normal.         Behavior: Behavior normal.         Thought Content: Thought content normal.         Judgment: Judgment normal.       Cervical Papanicolaou done within 1 year of adm    Results:     Lab Results   Component Value Date    WBC 6.4 2025    HGB 14.0 2025    HCT 42.8 2025    .0 2025    CREATSERUM 0.89 2025    BUN 16 2025     2025    K 3.9 2025     2025    CO2 29.0 2025    GLU 90 2025    CA 10.0 2025    ALB 4.9 (H) 2025    ALKPHO 56  2025    BILT 0.5 2025    TP 7.6 2025    AST 23 2025    ALT 24 2025     No results found.        Assessment/Plan:     * No active hospital problems. *  Plan total laparoscopic hysterectomy, bilateral salpingectomy, excision of endometriosis, cystoscopy  Ancef 2g, Flagyl 500mg  SCDs            Matilde Mauricio MD  2025         [1]   Past Medical History:   Allergic rhinitis    Hx of motion sickness     (normal spontaneous vaginal delivery) (HCC)    x3    Visual impairment    glasses   [2]   Past Surgical History:  Procedure Laterality Date    Appendectomy      Colonoscopy      Colonoscopy N/A 2025    Procedure: COLONOSCOPY;  Surgeon: Jayro Lobo MD;  Location:  ENDOSCOPY    Laparoscopic cholecystectomy            X3    Reconstr nose+markus septal repair     [3]   Family History  Problem Relation Age of Onset    Hypertension Mother     Kidney Disease Mother     Diabetes Father     Hypertension Father     Dementia Maternal Grandmother     Diabetes Paternal Grandmother    [4]   Social History  Socioeconomic History    Marital status:    Tobacco Use    Smoking status: Never     Passive exposure: Never    Smokeless tobacco: Never   Vaping Use    Vaping status: Never Used   Substance and Sexual Activity    Alcohol use: Yes     Comment: Rare occasion, < 1/month    Drug use: Never    Sexual activity: Yes     Partners: Male   Other Topics Concern    Caffeine Concern No    Exercise Yes    Seat Belt Yes    Special Diet No    Stress Concern No    Weight Concern No     Social Drivers of Health     Food Insecurity: Low Risk  (2022)    Received from [facility]    Food Insecurity     Have there been times that your food ran out, and you didn't have money to get more?: No     Are there times that you worry that this might happen?: No   Transportation Needs: Low Risk  (2022)    Received from [facility]     Transportation Needs     Do you have trouble getting transportation to medical appointments?: No   Housing Stability: Low Risk  (4/9/2022)    Received from [facility]    Housing Stability     Are you concerned about having a safe and reliable place to live?: No   [5]   Allergies  Allergen Reactions    Sulfa Antibiotics HIVES   [6]   No medications prior to admission.

## 2025-05-29 NOTE — OPERATIVE REPORT
Pre-op Diagnosis: Pelvic Pain    Post-op Diagnosis: Same as above, endometriosis    Procedure: Total Laparoscopic Hysterectomy, bilateral salpingectomy, excision of endometriosis, bilateral ureterolysis, cystoscopy    Surgeon: Dr. Matilde Mauricio  Assistant: Papi Heck RN-FA   A certified first assistant was required for this procedure due to the patient's history of endometriosis     Urine Output: 600cc    Estimated Blood Loss: 20cc     Complications:  None     Findings: Normal appearing uterus, normal fallopian tubes and ovaries   Normal liver and stomach  Surgically absent gallbladder and appendix   Normal bladder and bilateral ureteral jets at end of case.       Description of Procedure: The patient was taken back to the operating room with the IV running.  Once general anesthesia was placed and found to be adequate, the patient was placed in the dorsal lithotomy position in adjustable Shayne stirrups.  The patient was then prepped and draped in the usual sterile fashion.  A Jordan catheter was then placed in the bladder and noted to be draining clear yellow urine.  A sterile weighted speculum was then placed in the posterior vagina.  Right-angle retractor was used to aid in the visualization of the cervix.  The anterior lip of the cervix was then grasped with a single-tooth tenaculum.  The uterus was then sounded to approximately 8 cm, and a large V-care uterine manipulator introduced in the uterine cavity and the single-tooth tenaculum was removed.  All other instrumentation removed from the patient's vagina.     Attention was then turned to the patient's abdomen, where the base of the umbilicus was grasped with Kocher clamp and everted.  Two towel clips were placed periumbilically.  Half-percent Sensorcaine was then injected.  Kocher clamp was removed, and a 5 mm skin incision was made.  While tenting the abdominal wall with the towel clips, the Veress needle was introduced in the abdominal cavity, where  intraabdominal placement was confirmed by a drop of intraabdominal pressure, insufflating with CO2 gas.  Once adequate pneumoperitoneum was obtained, the Veress needle was then removed and replaced by a 5 mm trocar and sleeve. Survey of patient's upper abdomen and pelvis revealed the above-mentioned findings.  The patient was placed in Trendelenburg position to help pack away the bowel into the upper abdomen.      The inferior epigastric vessels were identified in the bilateral lower quadrants.  Lateral to these vessels, 0.5% Sensorcaine injected, 5 mm skin incision made, and 5 mm trocar and sleeve advanced under direct visualization without difficulty.      Attention was then turned to the right fallopian tube, which was grasped and elevated, and the mesosalpinx transected down to the level of the cornua.  The right utero-ovarian ligament was then cauterized and transected down to the level of the round ligament.  The round ligament was then cauterized and transected, exposing the anterior and posterior leaflet of the broad ligament.  The anterior leaflet of the broad ligament was then transected alongside the uterus down to the level of the vesicouterine peritoneum, which was also transected, allowing for great mobilization of the bladder off the lower uterine segment and cervix without difficulty.  The posterior leaflet of the broad ligament was then transected alongside the uterus down to the level of the uterosacral ligament.  The right uterine vessels were skeletonized, cauterized, and transected, with good visualization of the ureter, as well as the cardinal ligaments were cauterized and transected down to the level of the uterosacrals.  The same steps were then repeated on the left side.  A colpotomy incision was made over the vaginal cup, ircumscribing the cervix until completely free.  The uterine specimen was then delivered through the vagina. The specimen was then sent to Pathology.           Kerlix-stuffed glove was then placed into the vagina to help maintain pneumoperitoneum.  At this time, the vaginal cuff angles were then grasped and elevated and were reapproximated while incorporating the ipsilateral uterosacral ligaments with 0 PDS in an interrupted mattress fashion.  Multiple stitches were placed in the midline to help reapproximate the entire vaginal cuff defect.  Now the cuff was oversewn with 3-0 V-loc in a running fashion.  A digital exam was performed at the end of the procedure to indeed note an intact vaginal cuff.      Endometriosis was now excised with the sonicision with attention to the course of the ureter. On the right it was away from the course of the endometriosis and on the left a ureterolysis was performed.     Copious amount of irrigation performed.  Very scant oozing was noted on the left side of the cuff which was hemostatic after PK gyrus was dessicated and then surgicel powder was placed over the cuff. No active bleeding noted from any of the surgical sites after deflation of the abdomen to less than 5 mmHg, therefore all instrumentation was removed from the patient's abdomen, as well as the trocar and sleeve.  All skin incisions were reapproximated with 4-0 Monocryl and covered with skin glue. Jordan catheter was then removed.    Using a 70-degree cystoscope, the entire bladder mucosa was visualized and appeared to be grossly within normal limits.  The bilateral ureteral orifices were identified, and strong orange-dyed urine jets were noted to be emanating bilaterally.  Therefore, the bladder was then drained and the cystoscope removed.  The patient tolerated the procedure well.  Sponge, lap, needle, and instrument counts correct x2 at the end of the procedure, and patient was taken to the recovery room, awake and in stable condition.

## 2025-05-29 NOTE — DISCHARGE INSTRUCTIONS
No heavy lifting (>10-15 lbs), no strenuous activity and pelvic rest X 12 weeks  No driving until not taking narcotic medication (usually at least 1 week)  Remove skin glue in 2 weeks  You may shower  Walk around daily

## 2025-05-29 NOTE — PROGRESS NOTES
Called to see patient by nurse as she was in Phase II and doing well but then when she went to get up to urinate she turned green and almost had a syncopal event.   Patient has also not yet voided since leaving OR and has received almost 4L of fluid     Patient is lying in bed, denies pain.   VSS- 86, 114/86  Arousable, oriented x 3  Abdomen- soft, non distended, min ttp, no rebound or guarding  Ext- no ct b/l    Patient is POD #0 S/p TLH, BS, excision of endometriosis, cystoscopy with near syncopal event- ? Secondary to vasovagal event   - Will admit for observation, if mental status does not return to baseline will order hospitalist consult  - Check stat H/H     All questions answered with patient and

## 2025-05-30 VITALS
DIASTOLIC BLOOD PRESSURE: 65 MMHG | TEMPERATURE: 98 F | BODY MASS INDEX: 21.31 KG/M2 | SYSTOLIC BLOOD PRESSURE: 97 MMHG | RESPIRATION RATE: 18 BRPM | WEIGHT: 127.88 LBS | OXYGEN SATURATION: 96 % | HEART RATE: 60 BPM | HEIGHT: 65 IN

## 2025-05-30 LAB
BASOPHILS # BLD AUTO: 0.01 X10(3) UL (ref 0–0.2)
BASOPHILS NFR BLD AUTO: 0.1 %
EOSINOPHIL # BLD AUTO: 0 X10(3) UL (ref 0–0.7)
EOSINOPHIL NFR BLD AUTO: 0 %
ERYTHROCYTE [DISTWIDTH] IN BLOOD BY AUTOMATED COUNT: 12.9 %
HCT VFR BLD AUTO: 35.9 % (ref 35–48)
HGB BLD-MCNC: 11.9 G/DL (ref 12–16)
IMM GRANULOCYTES # BLD AUTO: 0.04 X10(3) UL (ref 0–1)
IMM GRANULOCYTES NFR BLD: 0.4 %
LYMPHOCYTES # BLD AUTO: 0.71 X10(3) UL (ref 1–4)
LYMPHOCYTES NFR BLD AUTO: 7.8 %
MCH RBC QN AUTO: 29 PG (ref 26–34)
MCHC RBC AUTO-ENTMCNC: 33.1 G/DL (ref 31–37)
MCV RBC AUTO: 87.6 FL (ref 80–100)
MONOCYTES # BLD AUTO: 0.58 X10(3) UL (ref 0.1–1)
MONOCYTES NFR BLD AUTO: 6.4 %
NEUTROPHILS # BLD AUTO: 7.75 X10 (3) UL (ref 1.5–7.7)
NEUTROPHILS # BLD AUTO: 7.75 X10(3) UL (ref 1.5–7.7)
NEUTROPHILS NFR BLD AUTO: 85.3 %
PLATELET # BLD AUTO: 148 10(3)UL (ref 150–450)
RBC # BLD AUTO: 4.1 X10(6)UL (ref 3.8–5.3)
WBC # BLD AUTO: 9.1 X10(3) UL (ref 4–11)

## 2025-05-30 PROCEDURE — 85025 COMPLETE CBC W/AUTO DIFF WBC: CPT | Performed by: OBSTETRICS & GYNECOLOGY

## 2025-05-30 NOTE — PLAN OF CARE
Assumed care 0730.  Alert and oriented x4.  RA  Hgb 11.9.  Voided  No nausea  LR @ 100 ml infusing in PIV.  Scheduled moltrin.  Reg diet  No bleeding.   Discharge today ,

## 2025-05-30 NOTE — PROGRESS NOTES
[facility]   part of [facility]    Progress Note    Mariela Fontanez Patient Status:  Outpatient in a Bed    1995 MRN [id number]   Location [facility] 0SW-A Attending Matilde Mauricio MD   Hosp Day # 0 PCP No primary care provider on file.     Chief Complaint: POd#1    Subjective:   Patient notes she is feeling much better today. No confusion, isac regular diet, no flatus yet, but voiding without difficulty.     Gastrointestinal:  Negative for nausea, vomiting and abdominal pain.   Psychiatric/Behavioral:  Negative for behavioral problems, confusion and agitation.        Objective:   Blood pressure 97/65, pulse 60, temperature 98 °F (36.7 °C), temperature source Oral, resp. rate 18, height 5' 5\" (1.651 m), weight 127 lb 13.9 oz (58 kg), last menstrual period 2025, SpO2 96%, not currently breastfeeding.  Physical Exam  Neurological:      General: No focal deficit present.      Mental Status: She is alert and oriented to person, place, and time. Mental status is at baseline.   Psychiatric:         Mood and Affect: Mood normal.         Behavior: Behavior normal.         Thought Content: Thought content normal.         Judgment: Judgment normal.         Results:   Lab Results   Component Value Date    WBC 9.1 2025    HGB 11.9 (L) 2025    HCT 35.9 2025    .0 (L) 2025    CREATSERUM 0.89 2025    BUN 16 2025     2025    K 3.9 2025     2025    CO2 29.0 2025    GLU 90 2025    CA 10.0 2025    ALB 4.9 (H) 2025    ALKPHO 56 2025    BILT 0.5 2025    TP 7.6 2025    AST 23 2025    ALT 24 2025       No results found.        Assessment & Plan:     * No active hospital problems. *  POD#1 s/p TLH, BS, cystoscopy excision of endometriosis admitted for observation due to near syncopal event in PACU.   Hgb stable  Meeting postoperative milestones and can be discharged to home   All  questions answered with patient          Matilde Mauricio MD  5/30/2025

## 2025-05-30 NOTE — PLAN OF CARE
NURSING ADMISSION NOTE      Patient admitted via Cart  Oriented to room.  Safety precautions initiated.  Bed in low position.  Call light in reach.    Received patient from PACU. AO x 4.   On room air, clear lungs, sats 94%.   Minaya to gravity, minaya care done. To be dc'd in am.   Pain is mild. Tylenol po given.   IV fluids infusing.   0605: Minaya dc'd, obtained  1100 ml out. Pad was dry and clean.       Problem: Patient/Family Goals  Goal: Patient/Family Long Term Goal  Description: Patient's Long Term Goal: Discharge home when medically stable.    Interventions:  O2 protocol,   Fall precaution, bed alarm on, non-skid socks on,  call light and bedside table within reach.  Monitor intake and output.  Electrolyte protocol.  SCD for VTE  - See additional Care Plan goals for specific interventions  Outcome: Progressing  Goal: Patient/Family Short Term Goal  Description: Patient's Short Term Goal: Vital signs stable this shift    Interventions:   O2 protocol,   Fall precaution, bed alarm on, non-skid socks on,  call light and bedside table within reach.  Monitor intake and output.  Electrolyte protocol.  SCD for VTE    - See additional Care Plan goals for specific interventions  Outcome: Progressing     Problem: PAIN - ADULT  Goal: Verbalizes/displays adequate comfort level or patient's stated pain goal  Description: INTERVENTIONS:  - Encourage pt to monitor pain and request assistance  - Assess pain using appropriate pain scale  - Administer analgesics based on type and severity of pain and evaluate response  - Implement non-pharmacological measures as appropriate and evaluate response  - Consider cultural and social influences on pain and pain management  - Manage/alleviate anxiety  - Utilize distraction and/or relaxation techniques  - Monitor for opioid side effects  - Notify MD/LIP if interventions unsuccessful or patient reports new pain  - Anticipate increased pain with activity and pre-medicate as  appropriate  Outcome: Progressing

## (undated) DEVICE — CURVED JAW CORDLESS ULTRASONIC DISSECTOR: Brand: SONICISION 7

## (undated) DEVICE — SHEARS: Brand: ENDO MINI-SHEARS

## (undated) DEVICE — ABSORBABLE WOUND CLOSURE DEVICE: Brand: V-LOC 180

## (undated) DEVICE — STERILE H2O FOR IRRIG 3000 ML BAG

## (undated) DEVICE — GLOVE SUR 6 SENSICARE PI PIP CRM PWD F

## (undated) DEVICE — GYN LAP/ROBOTIC: Brand: MEDLINE INDUSTRIES, INC.

## (undated) DEVICE — SOLUTION IRRIG 1000ML 0.9% NACL USP BTL

## (undated) DEVICE — FILTERLINE NASAL ADULT O2/CO2

## (undated) DEVICE — FORCEPS BPLR L33CM DISECT TWO TIER JAW DSGN

## (undated) DEVICE — Device

## (undated) DEVICE — GLOVE SUR 6.5 SENSICARE NEOPR PWD F

## (undated) DEVICE — COVER,LIGHT,CAMERA,HARD,1/PK,STRL: Brand: MEDLINE

## (undated) DEVICE — SUT MCRYL 4-0 18IN PS-2 ABSRB UD 19MM 3/8 CIR

## (undated) DEVICE — HARMONIC LAPARASONIC DISSECTING HOOKS 5MM: Brand: HARMONIC LAPARASONIC

## (undated) DEVICE — PLUMEPORT ACTIV LAPAROSCOPIC SMOKE FILTRATION DEVICE: Brand: PLUMEPORT ACTIVE

## (undated) DEVICE — ENDOPATH 5MM CURVED SCISSORS WITH MONOPOLAR CAUTERY: Brand: ENDOPATH

## (undated) DEVICE — LAPAROSCOPIC TISSUE RETRIEVAL SAC FOR USE WITH MINIMALLY INVASIVE PROCEDURES: Brand: ESPINER TISSUE RETRIEVAL SYSTEM

## (undated) DEVICE — V2 SPECIMEN COLLECTION MANIFOLD KIT: Brand: NEPTUNE

## (undated) DEVICE — POWDER HEMSTAT 3GM OXIDIZED REGENERATED CELOS

## (undated) DEVICE — SOLUTION DURAPREP 26ML APPLICATOR

## (undated) DEVICE — VCARE LARGE, UTERINE MANIPULATOR, VAGINAL-CERVICAL-AHLUWALIA'S-RETRACTOR-ELEVATOR: Brand: VCARE

## (undated) DEVICE — 40580 - THE PINK PAD - ADVANCED TRENDELENBURG POSITIONING KIT: Brand: 40580 - THE PINK PAD - ADVANCED TRENDELENBURG POSITIONING KIT

## (undated) DEVICE — LACTATED. R IRRIG 3000ML

## (undated) DEVICE — INSUFFLATION NEEDLE TO ESTABLISH PNEUMOPERITONEUM.: Brand: INSUFFLATION NEEDLE

## (undated) DEVICE — TROCAR: Brand: KII FIOS FIRST ENTRY

## (undated) DEVICE — SET TUBING DELTER CYSTO IRRIG L77IN DIA0.241IN BLDR NVENT

## (undated) DEVICE — 1200CC GUARDIAN II: Brand: GUARDIAN

## (undated) DEVICE — MUCUS TRAP, 10FR, DELEE, W/VA: Brand: MEDLINE INDUSTRIES, INC.

## (undated) DEVICE — 3M™ RED DOT™ MONITORING ELECTRODE WITH FOAM TAPE AND STICKY GEL 2570-3, 3/BAG, 200/CASE, 54/PLT: Brand: RED DOT™

## (undated) DEVICE — TROCAR: Brand: KII® SLEEVE

## (undated) DEVICE — KIT CUSTOM ENDOPROCEDURE STERIS

## (undated) DEVICE — [HIGH FLOW INSUFFLATOR,  DO NOT USE IF PACKAGE IS DAMAGED,  KEEP DRY,  KEEP AWAY FROM SUNLIGHT,  PROTECT FROM HEAT AND RADIOACTIVE SOURCES.]: Brand: PNEUMOSURE

## (undated) DEVICE — SUT PDS II 0 36IN CT-1 ABSRB VLT L36MM 1/2

## (undated) DEVICE — VALVE AIR/H20 DEFENDO SCT BX

## (undated) DEVICE — APPLICATOR ENDOSCP FOR ADJUNCTIVE HEMSTAS

## (undated) NOTE — LETTER
Patient Name: Mariela Fontanez  Surgery Date: 5/29/2025  Medical Record: [id number] CSN: [id number]      Surgeon(s):  Matilde Mauricio MD  Consent Procedure: TOTAL LAPAROSCOPIC HYSTERECTOMY  BILATERAL SALPINGECTOMY, CYSTOSCOPY, EXCISION OF ENDOMETRIOSIS  Anesthesia Type: General    Please confirm that Dr Mauricio does not want an antibiotic for this patient on 5/29/25 prior to surgery. Usual protocol is to provide an antibiotic.       Thank you,    HAMIDA YORK

## (undated) NOTE — LETTER
Patient Name: Mariela Fontanez  Surgery Date: 5/29/2025  Medical Record: [id number] CSN: [id number]      Surgeon(s):  Matilde Mauricio MD  Consent Procedure: TOTAL LAPAROSCOPIC HYSTERECTOMY  BILATERAL SALPINGECTOMY, CYSTOSCOPY, EXCISION OF ENDOMETRIOSIS  Anesthesia Type: General    Please confirm that Dr Mauricio does not want an antibiotic for this patient on 5/29/25 prior to surgery. Usual protocol is to provide an antibiotic.       Thank you,    HAMIDA YORK